# Patient Record
Sex: MALE | Race: OTHER | HISPANIC OR LATINO
[De-identification: names, ages, dates, MRNs, and addresses within clinical notes are randomized per-mention and may not be internally consistent; named-entity substitution may affect disease eponyms.]

---

## 2019-08-24 ENCOUNTER — TRANSCRIPTION ENCOUNTER (OUTPATIENT)
Age: 79
End: 2019-08-24

## 2019-08-24 ENCOUNTER — INPATIENT (INPATIENT)
Facility: HOSPITAL | Age: 79
LOS: 0 days | Discharge: HOME | DRG: 639 | End: 2019-08-24
Attending: INTERNAL MEDICINE | Admitting: INTERNAL MEDICINE
Payer: COMMERCIAL

## 2019-08-24 VITALS
TEMPERATURE: 98 F | WEIGHT: 174.83 LBS | DIASTOLIC BLOOD PRESSURE: 89 MMHG | OXYGEN SATURATION: 97 % | HEART RATE: 67 BPM | RESPIRATION RATE: 15 BRPM | HEIGHT: 70 IN | SYSTOLIC BLOOD PRESSURE: 162 MMHG

## 2019-08-24 VITALS
OXYGEN SATURATION: 99 % | RESPIRATION RATE: 18 BRPM | TEMPERATURE: 98 F | HEART RATE: 61 BPM | WEIGHT: 178.13 LBS | SYSTOLIC BLOOD PRESSURE: 146 MMHG | HEIGHT: 70 IN | DIASTOLIC BLOOD PRESSURE: 86 MMHG

## 2019-08-24 DIAGNOSIS — Z91.89 OTHER SPECIFIED PERSONAL RISK FACTORS, NOT ELSEWHERE CLASSIFIED: ICD-10-CM

## 2019-08-24 DIAGNOSIS — T38.3X1A POISONING BY INSULIN AND ORAL HYPOGLYCEMIC [ANTIDIABETIC] DRUGS, ACCIDENTAL (UNINTENTIONAL), INITIAL ENCOUNTER: ICD-10-CM

## 2019-08-24 DIAGNOSIS — Z29.9 ENCOUNTER FOR PROPHYLACTIC MEASURES, UNSPECIFIED: ICD-10-CM

## 2019-08-24 LAB
ALBUMIN SERPL ELPH-MCNC: 4.4 G/DL — SIGNIFICANT CHANGE UP (ref 3.3–5)
ALP SERPL-CCNC: 56 U/L — SIGNIFICANT CHANGE UP (ref 40–120)
ALT FLD-CCNC: 7 U/L — LOW (ref 10–45)
ANION GAP SERPL CALC-SCNC: 11 MMOL/L — SIGNIFICANT CHANGE UP (ref 5–17)
APTT BLD: 31.7 SEC — SIGNIFICANT CHANGE UP (ref 27.5–36.3)
AST SERPL-CCNC: 21 U/L — SIGNIFICANT CHANGE UP (ref 10–40)
BILIRUB SERPL-MCNC: 0.4 MG/DL — SIGNIFICANT CHANGE UP (ref 0.2–1.2)
BUN SERPL-MCNC: 13 MG/DL — SIGNIFICANT CHANGE UP (ref 7–23)
CALCIUM SERPL-MCNC: 10.2 MG/DL — SIGNIFICANT CHANGE UP (ref 8.4–10.5)
CHLORIDE SERPL-SCNC: 99 MMOL/L — SIGNIFICANT CHANGE UP (ref 96–108)
CO2 SERPL-SCNC: 30 MMOL/L — SIGNIFICANT CHANGE UP (ref 22–31)
CREAT SERPL-MCNC: 0.91 MG/DL — SIGNIFICANT CHANGE UP (ref 0.5–1.3)
ETHANOL SERPL-MCNC: <10 MG/DL — SIGNIFICANT CHANGE UP (ref 0–10)
GLUCOSE SERPL-MCNC: 107 MG/DL — HIGH (ref 70–99)
HCT VFR BLD CALC: 44.4 % — SIGNIFICANT CHANGE UP (ref 39–50)
HGB BLD-MCNC: 14.6 G/DL — SIGNIFICANT CHANGE UP (ref 13–17)
INR BLD: 1.16 — SIGNIFICANT CHANGE UP (ref 0.88–1.16)
MCHC RBC-ENTMCNC: 29.6 PG — SIGNIFICANT CHANGE UP (ref 27–34)
MCHC RBC-ENTMCNC: 32.9 GM/DL — SIGNIFICANT CHANGE UP (ref 32–36)
MCV RBC AUTO: 90.1 FL — SIGNIFICANT CHANGE UP (ref 80–100)
NRBC # BLD: 0 /100 WBCS — SIGNIFICANT CHANGE UP (ref 0–0)
PLATELET # BLD AUTO: 269 K/UL — SIGNIFICANT CHANGE UP (ref 150–400)
POTASSIUM SERPL-MCNC: 4.7 MMOL/L — SIGNIFICANT CHANGE UP (ref 3.5–5.3)
POTASSIUM SERPL-SCNC: 4.7 MMOL/L — SIGNIFICANT CHANGE UP (ref 3.5–5.3)
PROT SERPL-MCNC: 7.5 G/DL — SIGNIFICANT CHANGE UP (ref 6–8.3)
PROTHROM AB SERPL-ACNC: 13.2 SEC — HIGH (ref 10–12.9)
RBC # BLD: 4.93 M/UL — SIGNIFICANT CHANGE UP (ref 4.2–5.8)
RBC # FLD: 12.5 % — SIGNIFICANT CHANGE UP (ref 10.3–14.5)
SODIUM SERPL-SCNC: 140 MMOL/L — SIGNIFICANT CHANGE UP (ref 135–145)
WBC # BLD: 7.61 K/UL — SIGNIFICANT CHANGE UP (ref 3.8–10.5)
WBC # FLD AUTO: 7.61 K/UL — SIGNIFICANT CHANGE UP (ref 3.8–10.5)

## 2019-08-24 PROCEDURE — 70498 CT ANGIOGRAPHY NECK: CPT | Mod: 26

## 2019-08-24 PROCEDURE — 70498 CT ANGIOGRAPHY NECK: CPT

## 2019-08-24 PROCEDURE — 82962 GLUCOSE BLOOD TEST: CPT

## 2019-08-24 PROCEDURE — 70496 CT ANGIOGRAPHY HEAD: CPT | Mod: 26

## 2019-08-24 PROCEDURE — 84484 ASSAY OF TROPONIN QUANT: CPT

## 2019-08-24 PROCEDURE — 99285 EMERGENCY DEPT VISIT HI MDM: CPT

## 2019-08-24 PROCEDURE — 85027 COMPLETE CBC AUTOMATED: CPT

## 2019-08-24 PROCEDURE — 70450 CT HEAD/BRAIN W/O DYE: CPT

## 2019-08-24 PROCEDURE — 99223 1ST HOSP IP/OBS HIGH 75: CPT | Mod: AI

## 2019-08-24 PROCEDURE — 83690 ASSAY OF LIPASE: CPT

## 2019-08-24 PROCEDURE — 70496 CT ANGIOGRAPHY HEAD: CPT

## 2019-08-24 PROCEDURE — 80307 DRUG TEST PRSMV CHEM ANLYZR: CPT

## 2019-08-24 PROCEDURE — 85730 THROMBOPLASTIN TIME PARTIAL: CPT

## 2019-08-24 PROCEDURE — 36415 COLL VENOUS BLD VENIPUNCTURE: CPT

## 2019-08-24 PROCEDURE — 70450 CT HEAD/BRAIN W/O DYE: CPT | Mod: 26,59

## 2019-08-24 PROCEDURE — 80053 COMPREHEN METABOLIC PANEL: CPT

## 2019-08-24 PROCEDURE — 85610 PROTHROMBIN TIME: CPT

## 2019-08-24 RX ORDER — METFORMIN HYDROCHLORIDE 850 MG/1
500 TABLET ORAL
Qty: 30000 | Refills: 0
Start: 2019-08-24 | End: 2019-09-22

## 2019-08-24 RX ORDER — GLYBURIDE-METFORMIN HYDROCHLORIDE 1.25; 25 MG/1; MG/1
1 TABLET ORAL
Qty: 0 | Refills: 0 | DISCHARGE

## 2019-08-24 NOTE — H&P ADULT - NSHPLABSRESULTS_GEN_ALL_CORE
.  LABS:                         14.6   7.61  )-----------( 269      ( 24 Aug 2019 13:14 )             44.4     08-24    140  |  99  |  13  ----------------------------<  107<H>  4.7   |  30  |  0.91    Ca    10.2      24 Aug 2019 13:14    TPro  7.5  /  Alb  4.4  /  TBili  0.4  /  DBili  x   /  AST  21  /  ALT  7<L>  /  AlkPhos  56  08-24    PT/INR - ( 24 Aug 2019 13:14 )   PT: 13.2 sec;   INR: 1.16          PTT - ( 24 Aug 2019 13:14 )  PTT:31.7 sec    CARDIAC MARKERS ( 24 Aug 2019 13:14 )  x     / <0.01 ng/mL / x     / x     / x                RADIOLOGY, EKG & ADDITIONAL TESTS: Reviewed.

## 2019-08-24 NOTE — H&P ADULT - ASSESSMENT
79M with two episodes of sympotmatic hypoglycemia FSG 70 and 60 after waking in AM likely due to metformin-glyburide. No fevers chills, infections, changes to daily routine to suggest sepsis or insulinoma.

## 2019-08-24 NOTE — H&P ADULT - ATTENDING COMMENTS
Patient was seen and examined by me at bedside. I agree with resident's note, subjective, objective physical exam, assessment and plan with following modifications/additions.     Admitted for Iatrogenic hypoglycemia 2/2 Glyburide (POA)    A/P: Medically optimized for discharge. Will D/C Glyburide.   C/w Metformin 500mg bid  Outpatient follow up with PCP

## 2019-08-24 NOTE — DISCHARGE NOTE PROVIDER - PROVIDER TOKENS
FREE:[LAST:[Private],FIRST:[PCP],PHONE:[(   )    -],FAX:[(   )    -],ADDRESS:[See your PCP in one week]]

## 2019-08-24 NOTE — DISCHARGE NOTE NURSING/CASE MANAGEMENT/SOCIAL WORK - NSDCDPATPORTLINK_GEN_ALL_CORE
You can access the [facility] Patient Portal, offered by [facility], by registering with the following website: [email or web address]

## 2019-08-24 NOTE — ED PROVIDER NOTE - CLINICAL SUMMARY MEDICAL DECISION MAKING FREE TEXT BOX
79M pmh COPD, DM, htn, stroke years ago, p/w 2x episodes of confusion and hypoglycemia in past 2 days. First episode yesterday morning, pt woke up and was talking to family about plans to go to work (pt retired 20yr ago), blood sugar checked and noted for be <70, pt had not taken his medication yet that day. Pt is on metformin, no insulin. Today pt had similar. Woke up, approx 9am pt noted to be standing and urinating on carpet, thought was in bathroom. BS noted to ~67, pt given coffee & toast. Pt had not yet taken his metformin today, thus brought to ED. No fever/chills, no n/v, no diarrhea, no abd pain, no cp, no sob. No hx prior episodes. Has hx of LUE weakness s/p stroke, nearly resolved, no sensation changes, exam unchanged. Concern hypoglycemia, medication side effect, consider intox as nurse noted hx of etoh on weekends, less likely stroke given global symptoms and no change from baseline. 79M pmh COPD, DM, htn, stroke years ago, p/w 2x episodes of confusion and hypoglycemia in past 2 days. First episode yesterday morning, pt woke up and was talking to family about plans to go to work (pt retired 20yr ago), blood sugar checked and noted for be <70, pt had not taken his medication yet that day. Pt is on metformin BID, no insulin. Today pt had similar. Woke up, approx 9am pt noted to be standing and urinating on carpet, thought was in bathroom. BS noted to ~67, pt given coffee & toast. Pt had not yet taken his metformin today, but did take it last night. Thus brought to ED. No fever/chills, no n/v, no diarreah, no abd pain, no cp, no sob. No hx prior episodes. Has hx of LUE weakness s/p stroke, nearly resolved, no sensation changes, exam unchanged. Concern hypoglycemia, medication side effect, consider intox as nurse noted hx of etoh on weekends, less likely stroke given global symptoms and no change from baseline. 79M pmh COPD, DM, htn, stroke years ago, p/w 2x episodes of confusion and hypoglycemia in past 2 days. First episode yesterday morning, pt woke up and was talking to family about plans to go to work (pt retired 20yr ago), blood sugar checked and noted for be <70, pt had not taken his medication yet that day. Pt is on metformin BID, no insulin. Today pt had similar. Woke up, approx 9am pt noted to be standing and urinating on carpet, thought was in bathroom. BS noted to ~67, pt given coffee & toast. Pt had not yet taken his metformin today, but did take it last night. Thus brought to ED. No fever/chills, no n/v, no diarreah, no abd pain, no cp, no sob. No hx prior episodes. Has hx of LUE weakness s/p stroke, nearly resolved, no sensation changes, exam unchanged. Concern hypoglycemia, medication side effect, consider intox as nurse noted hx of etoh on weekends, less likely stroke given global symptoms and no change from baseline. CT with acute findings. Given 2x episodes hypoglycemia without sufficiently identifiable cause, will admit med team. 79M pmh COPD, DM, htn, stroke years ago, p/w 2x episodes of confusion and hypoglycemia in past 2 days. First episode yesterday morning, pt woke up and was talking to family about plans to go to work (pt retired 20yr ago), blood sugar checked and noted for be <70, pt had not taken his medication yet that day. Pt is on metformin BID, no insulin. Today pt had similar. Woke up, approx 9am pt noted to be standing and urinating on carpet, thought was in bathroom. BS noted to ~67, pt given coffee & toast. Pt had not yet taken his metformin today, but did take it last night. Thus brought to ED. No fever/chills, no n/v, no diarreah, no abd pain, no cp, no sob. No hx prior episodes. Has hx of LUE weakness s/p stroke, nearly resolved, no sensation changes, exam unchanged. Concern hypoglycemia, medication side effect, consider intox as nurse noted hx of etoh on weekends, less likely stroke given global symptoms and no change from baseline. CT with acute findings. Is on glyburide/metformin combo. Given 2x episodes hypoglycemia without sufficiently identifiable cause, will admit med team.

## 2019-08-24 NOTE — PATIENT PROFILE ADULT - IS THERE A SUSPICION OF ABUSE/NEGLIGENCE?
August 8, 2018     [address]    Patient: Jonny Tavares   YOB: 1974   Date of Visit: 7/10/2018       Dear Dr Reyez Keep:    Thank you for referring Jonny Tavares to me for evaluation  Below are the relevant portions of my H&P  If you have questions, please do not hesitate to call me  I look forward to following Mary Dc along with you  Sincerely,        No name on file          CC: No Recipients no

## 2019-08-24 NOTE — H&P ADULT - HISTORY OF PRESENT ILLNESS
79M PMH CVA with LUE weakness, HTN, DM presents for two episodes of symptomatic hypoglycemia to 70s, 60s with AMS for past two day. No fevers, chills, changes to daily habits outside of episodes. Lives with family.

## 2019-08-24 NOTE — H&P ADULT - NSHPPHYSICALEXAM_GEN_ALL_CORE
.  VITAL SIGNS:  T(C): 36.6 (08-24-19 @ 17:37), Max: 36.8 (08-24-19 @ 12:41)  T(F): 97.9 (08-24-19 @ 17:37), Max: 98.2 (08-24-19 @ 12:41)  HR: 64 (08-24-19 @ 17:37) (61 - 64)  BP: 164/84 (08-24-19 @ 17:37) (146/86 - 164/84)  BP(mean): --  RR: 18 (08-24-19 @ 17:37) (18 - 18)  SpO2: 99% (08-24-19 @ 17:37) (99% - 99%)  Wt(kg): --    PHYSICAL EXAM:    Constitutional: WDWN resting comfortably in bed; NAD  Head: NC/AT  Eyes: PERRL, EOMI, anicteric sclera  ENT: no nasal discharge; uvula midline, no oropharyngeal erythema or exudates; MMM  Neck: supple; no JVD or thyromegaly  Respiratory: CTA B/L; no W/R/R, no retractions  Cardiac: +S1/S2; RRR; no M/R/G; PMI non-displaced  Gastrointestinal: soft, NT/ND; no rebound or guarding; +BSx4  Genitourinary: normal external genitalia  Back: spine midline, no bony tenderness or step-offs; no CVAT B/L  Extremities: WWP, no clubbing or cyanosis; no peripheral edema  Musculoskeletal: NROM x4; no joint swelling, tenderness or erythema  Vascular: 2+ radial, femoral, DP/PT pulses B/L  Dermatologic: skin warm, dry and intact; no rashes, wounds, or scars  Lymphatic: no submandibular or cervical LAD  Neurologic: AAOx3; CNII-XII grossly intact; no focal deficits  Psychiatric: affect and characteristics of appearance, verbalizations, behaviors are appropriate

## 2019-08-24 NOTE — ED ADULT TRIAGE NOTE - CCCP TRG CHIEF CMPLNT
hypoglycemia
FREE:[LAST:[pediatrics clinic],FIRST:[[facility]PHONE:[(   )    -],FAX:[(   )    -]]

## 2019-08-24 NOTE — ED PROVIDER NOTE - OBJECTIVE STATEMENT
79M pmh COPD, DM, htn, stroke years ago, p/w 2x episodes of confusion and hypoglycemia in past 2 days. First episode yesterday morning, pt woke up and was talking to family about plans to go to work (pt retired 20yr ago), blood sugar checked and noted for be <70, pt had not taken his medication yet that day. Pt is on metformin, no insulin. Today pt had similar. Woke up, approx 9am pt noted to be standing and urinating on carpet, thought was in bathroom. BS noted to ~67, pt given coffee & toast. Pt had not yet taken his metformin today, thus brought to ED.   No fever/chills, no n/v, no diarreah, no abd pain, no cp, no sob. No hx prior episodes.  Has hx of LUE weakness s/p stroke, nearly resolved, no sensation changes. 79M pmh COPD, DM, htn, stroke years ago, p/w 2x episodes of confusion and hypoglycemia in past 2 days. First episode yesterday morning, pt woke up and was talking to family about plans to go to work (pt retired 20yr ago), blood sugar checked and noted for be <70, pt had not taken his medication yet that day. Pt is on metformin BID, no insulin. Today pt had similar. Woke up, approx 9am pt noted to be standing and urinating on carpet, thought was in bathroom. BS noted to ~67, pt given coffee & toast. Pt had not yet taken his metformin today, but did take it last night. Thus brought to ED. No fever/chills, no n/v, no diarreah, no abd pain, no cp, no sob. No hx prior episodes.  Has hx of LUE weakness s/p stroke, nearly resolved, no sensation changes.

## 2019-08-24 NOTE — ED PROVIDER NOTE - PHYSICAL EXAMINATION
VITAL SIGNS: I have reviewed nursing notes and confirm.  CONSTITUTIONAL: Well-developed; well-nourished; in no acute distress.  SKIN: Skin is warm and dry, no acute rash.  HEAD: Normocephalic; atraumatic.  EYES:  EOM intact; conjunctiva and sclera clear.  ENT: No nasal discharge; airway clear.  NECK: Supple; Voluntary FROM  CARD: No rubs appreciated, Regular rate and rhythm.  RESP: No wheezes, no rales. No respiratory distress  ABD: Soft; non-distended; non-tender; no rebound or guarding  EXT: Normal ROM. No cyanosis or edema.  NEURO: Patient is alert, oriented x person, place and time.  Cranial nerves 2-12 are intact.  Normal gait and speech.  Cerebellar testing normal:  negative pronator drift, normal coordination and normal finger to nose on R w L side mild difficulty (baseline), LUE 4/5 strength, 5/5 BUE/BLE;  Normal proprioception and sensory exam  PSYCH: Cooperative, appropriate.

## 2019-08-24 NOTE — DISCHARGE NOTE PROVIDER - NSDCCPCAREPLAN_GEN_ALL_CORE_FT
PRINCIPAL DISCHARGE DIAGNOSIS  Diagnosis: Hypoglycemia secondary to sulfonylurea, accidental or unintentional, initial encounter  Assessment and Plan of Treatment: You came in because you were having episodes of confusion in the morning while your fingerstick glu was low to the 60s. This is likely due to your use of the combined Metformin-Glyburide pill. Glyburide causes low sugars in elderly people. You will stop taking this pill and take Metformin 500mg twice a day. You should start taking Metformin on 8/28/2019. Take a snack before you go to bed for the next week. Take your blood sugar anytime you have these symptoms again. Follow up with your PCP in one week.

## 2019-08-24 NOTE — ED ADULT NURSE NOTE - OBJECTIVE STATEMENT
79 year old male PHX Parkinson's, HTN, DM, CVA (with left sided residual weakness) c/o disorientation x 2 episodes, yesterday at 06:00 and today at 09:00. As per pt's daughter, pt thought that he was in the bathroom yesterday and urinated on the floor. Daughter quotes "we called the EMS and they checked his sugar and it was 65, they gave him a sugar gel and he got better." Also reported today, "he thought he was going to school, like he was lost." Pt does not recall events. Denies any chest pain, sob, dizziness, weakness, f/v, abdominal pain, urinary symptoms. Denies changes in medication. 79 year old male PHX Parkinson's, HTN, DM, CVA (with left sided residual weakness) c/o disorientation x 2 episodes, yesterday at 06:00 and today at 09:00. As per pt's daughter, pt thought that he was in the bathroom yesterday and urinated on the floor. Daughter quotes "we called the EMS and they checked his sugar and it was 65, they gave him a sugar gel and he got better." Also reported today, "he thought he was going to school, like he was lost." Pt does not recall events. Denies any chest pain, sob, dizziness, weakness, f/c, abdominal pain, urinary symptoms. Denies changes in medication.

## 2019-08-29 DIAGNOSIS — Z86.73 PERSONAL HISTORY OF TRANSIENT ISCHEMIC ATTACK (TIA), AND CEREBRAL INFARCTION WITHOUT RESIDUAL DEFICITS: ICD-10-CM

## 2019-08-29 DIAGNOSIS — I10 ESSENTIAL (PRIMARY) HYPERTENSION: ICD-10-CM

## 2019-08-29 DIAGNOSIS — Z79.84 LONG TERM (CURRENT) USE OF ORAL HYPOGLYCEMIC DRUGS: ICD-10-CM
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
## 2019-08-29 DIAGNOSIS — J44.9 CHRONIC OBSTRUCTIVE PULMONARY DISEASE, UNSPECIFIED: ICD-10-CM

## 2019-08-29 DIAGNOSIS — E16.2 HYPOGLYCEMIA, UNSPECIFIED: ICD-10-CM

## 2019-08-29 DIAGNOSIS — T38.3X5A ADVERSE EFFECT OF INSULIN AND ORAL HYPOGLYCEMIC [ANTIDIABETIC] DRUGS, INITIAL ENCOUNTER: ICD-10-CM

## 2019-08-29 DIAGNOSIS — Z79.82 LONG TERM (CURRENT) USE OF ASPIRIN: ICD-10-CM

## 2019-08-29 DIAGNOSIS — G20 PARKINSON'S DISEASE: ICD-10-CM

## 2019-08-29 DIAGNOSIS — E11.649 TYPE 2 DIABETES MELLITUS WITH HYPOGLYCEMIA WITHOUT COMA: ICD-10-CM

## 2020-01-06 ENCOUNTER — EMERGENCY (EMERGENCY)
Facility: HOSPITAL | Age: 80
LOS: 1 days | Discharge: HOME | End: 2020-01-06
Attending: EMERGENCY MEDICINE | Admitting: EMERGENCY MEDICINE
Payer: MEDICARE

## 2020-01-06 VITALS
RESPIRATION RATE: 18 BRPM | SYSTOLIC BLOOD PRESSURE: 167 MMHG | HEIGHT: 71 IN | OXYGEN SATURATION: 96 % | HEART RATE: 67 BPM | DIASTOLIC BLOOD PRESSURE: 85 MMHG | TEMPERATURE: 98 F | WEIGHT: 173.06 LBS

## 2020-01-06 DIAGNOSIS — M54.41 LUMBAGO WITH SCIATICA, RIGHT SIDE: ICD-10-CM

## 2020-01-06 DIAGNOSIS — Z79.899 OTHER LONG TERM (CURRENT) DRUG THERAPY: ICD-10-CM

## 2020-01-06 DIAGNOSIS — I10 ESSENTIAL (PRIMARY) HYPERTENSION: ICD-10-CM

## 2020-01-06 DIAGNOSIS — Z79.4 LONG TERM (CURRENT) USE OF INSULIN: ICD-10-CM

## 2020-01-06 DIAGNOSIS — M54.5 LOW BACK PAIN: ICD-10-CM

## 2020-01-06 DIAGNOSIS — E11.9 TYPE 2 DIABETES MELLITUS WITHOUT COMPLICATIONS: ICD-10-CM

## 2020-01-06 PROCEDURE — 99283 EMERGENCY DEPT VISIT LOW MDM: CPT | Mod: 25

## 2020-01-06 PROCEDURE — 99284 EMERGENCY DEPT VISIT MOD MDM: CPT

## 2020-01-06 PROCEDURE — 96372 THER/PROPH/DIAG INJ SC/IM: CPT

## 2020-01-06 RX ORDER — KETOROLAC TROMETHAMINE 30 MG/ML
30 SYRINGE (ML) INJECTION ONCE
Refills: 0 | Status: DISCONTINUED | OUTPATIENT
Start: 2020-01-06 | End: 2020-01-06

## 2020-01-06 RX ORDER — METHOCARBAMOL 500 MG/1
1 TABLET, FILM COATED ORAL
Qty: 20 | Refills: 0
Start: 2020-01-06 | End: 2020-01-10

## 2020-01-06 RX ADMIN — Medication 30 MILLIGRAM(S): at 22:25

## 2020-01-06 RX ADMIN — Medication 30 MILLIGRAM(S): at 22:30

## 2020-01-06 NOTE — ED PROVIDER NOTE - PHYSICAL EXAMINATION
CONSTITUTIONAL: Well-appearing; well-nourished; in no apparent distress.   HEAD: Normocephalic; atraumatic.   EYES:  conjunctiva and sclera clear  ENT: normal nose; no rhinorrhea;  NECK: Supple; full ROM  RESPIRATORY: Breathing easily; no resp difficulty  GI: soft non tender, no bladder distension  BACK: no cervical/thoracic/lumbar tenderness to palpation.   EXT: No cyanosis or edema;   SKIN: Normal for age and race; warm; dry; good turgor; no apparent lesions or rash.   NEURO: A & O x 3; face symmetric; grossly unremarkable. sensation intact in all extremities to light touch. motor intact 5/5 symmetric with doriflexion/plantar flexion, knee extension/flexion, and hip flexion. Ambulatory with steady gait. no foot drop.   PSYCHOLOGICAL: The patient’s mood and manner are appropriate.

## 2020-01-06 NOTE — ED PROVIDER NOTE - PATIENT PORTAL LINK FT
You can access the FollowMyHealth Patient Portal offered by [facility] by registering at the following website: [email or web address]. By joining [employer]’s FollowMyHealth portal, you will also be able to view your health information using other applications (apps) compatible with our system.

## 2020-01-06 NOTE — ED PROVIDER NOTE - CLINICAL SUMMARY MEDICAL DECISION MAKING FREE TEXT BOX
here w/ likely sciatica. plan for supportive care. no red flag symptoms. DC home in NAD with strict return precautions given.

## 2020-01-06 NOTE — ED ADULT NURSE NOTE - CHPI ED NUR SYMPTOMS NEG
no bowel dysfunction/no motor function loss/no numbness/no fatigue/no bladder dysfunction/no difficulty bearing weight/no anorexia/no constipation/no tingling/no neck tenderness

## 2020-01-06 NOTE — ED PROVIDER NOTE - NSFOLLOWUPINSTRUCTIONS_ED_ALL_ED_FT
Sciatica     Sciatica is pain, numbness, weakness, or tingling along the path of the sciatic nerve. The sciatic nerve starts in the lower back and runs down the back of each leg. The nerve controls the muscles in the lower leg and in the back of the knee. It also provides feeling (sensation) to the back of the thigh, the lower leg, and the sole of the foot. Sciatica is a symptom of another medical condition that pinches or puts pressure on the sciatic nerve.    Generally, sciatica only affects one side of the body. Sciatica usually goes away on its own or with treatment. In some cases, sciatica may keep coming back (recur).    What are the causes?  This condition is caused by pressure on the sciatic nerve, or pinching of the sciatic nerve. This may be the result of:  A disk in between the bones of the spine (vertebrae) bulging out too far (herniated disk).Age-related changes in the spinal disks (degenerative disk disease).A pain disorder that affects a muscle in the buttock (piriformis syndrome).Extra bone growth (bone spur) near the sciatic nerve.An injury or break (fracture) of the pelvis.Pregnancy.Tumor (rare).What increases the risk?  The following factors may make you more likely to develop this condition:  Playing sports that place pressure or stress on the spine, such as football or weight lifting.Having poor strength and flexibility.A history of back injury.A history of back surgery.Sitting for long periods of time.Doing activities that involve repetitive bending or lifting.Obesity.What are the signs or symptoms?  Symptoms can vary from mild to very severe, and they may include:  Any of these problems in the lower back, leg, hip, or buttock:  Mild tingling or dull aches.Burning sensations.Sharp pains.Numbness in the back of the calf or the sole of the foot.Leg weakness.Severe back pain that makes movement difficult.These symptoms may get worse when you cough, sneeze, or laugh, or when you sit or stand for long periods of time. Being overweight may also make symptoms worse. In some cases, symptoms may recur over time.  How is this diagnosed?  This condition may be diagnosed based on:  Your symptoms.A physical exam. Your health care provider may ask you to do certain movements to check whether those movements trigger your symptoms.You may have tests, including:  Blood tests.X-rays.MRI.CT scan.How is this treated?  In many cases, this condition improves on its own, without any treatment. However, treatment may include:  Reducing or modifying physical activity during periods of pain.Exercising and stretching to strengthen your abdomen and improve the flexibility of your spine.Icing and applying heat to the affected area.Medicines that help:  To relieve pain and swelling.To relax your muscles.Injections of medicines that help to relieve pain, irritation, and inflammation around the sciatic nerve (steroids).Surgery.Follow these instructions at home:  Medicines     Take over-the-counter and prescription medicines only as told by your health care provider.Do not drive or operate heavy machinery while taking prescription pain medicine.Managing pain     If directed, apply ice to the affected area.  Put ice in a plastic bag.Place a towel between your skin and the bag.Leave the ice on for 20 minutes, 2–3 times a day.After icing, apply heat to the affected area before you exercise or as often as told by your health care provider. Use the heat source that your health care provider recommends, such as a moist heat pack or a heating pad.  Place a towel between your skin and the heat source.Leave the heat on for 20–30 minutes.Remove the heat if your skin turns bright red. This is especially important if you are unable to feel pain, heat, or cold. You may have a greater risk of getting burned.Activity     Return to your normal activities as told by your health care provider. Ask your health care provider what activities are safe for you.  Avoid activities that make your symptoms worse.Take brief periods of rest throughout the day. Resting in a lying or standing position is usually better than sitting to rest.  When you rest for longer periods, mix in some mild activity or stretching between periods of rest. This will help to prevent stiffness and pain.Avoid sitting for long periods of time without moving. Get up and move around at least one time each hour.Exercise and stretch regularly, as told by your health care provider.Do not lift anything that is heavier than 10 lb (4.5 kg) while you have symptoms of sciatica. When you do not have symptoms, you should still avoid heavy lifting, especially repetitive heavy lifting.When you lift objects, always use proper lifting technique, which includes:  Bending your knees.Keeping the load close to your body.Avoiding twisting.General instructions     Use good posture.  Avoid leaning forward while sitting.Avoid hunching over while standing.Maintain a healthy weight. Excess weight puts extra stress on your back and makes it difficult to maintain good posture.Wear supportive, comfortable shoes. Avoid wearing high heels.Avoid sleeping on a mattress that is too soft or too hard. A mattress that is firm enough to support your back when you sleep may help to reduce your pain.Keep all follow-up visits as told by your health care provider. This is important.Contact a health care provider if:  You have pain that wakes you up when you are sleeping.You have pain that gets worse when you lie down.Your pain is worse than you have experienced in the past.Your pain lasts longer than 4 weeks.You experience unexplained weight loss.Get help right away if:  You lose control of your bowel or bladder (incontinence).You have:  Weakness in your lower back, pelvis, buttocks, or legs that gets worse.Redness or swelling of your back.A burning sensation when you urinate

## 2020-01-06 NOTE — ED PROVIDER NOTE - OBJECTIVE STATEMENT
78yo PMH of parkinson's, DM type II, HLD, HTN here w/ moderate to severe R sided lower back pain. denies recent falls. no heavy lifting. no CVA tenderness. reports frequency with urination today. took Motrin this am without relief. no bladder/ bowel incontinence or retention. no numbness ,tingling, weakness. denies perineal anesthesia. has had this before and feels similar to prior episodes of sciatica.

## 2020-01-06 NOTE — ED ADULT NURSE NOTE - OBJECTIVE STATEMENT
79 y.o Ma &ox4 walked in from front triage w/ steady gait c.o lower back pain. Pt reports 7/10 R sided lower back pain. denies recent falls. no heavy lifting. no CVA tenderness. reports frequency with urination today. took Motrin this am without relief. no bladder/ bowel incontinence. no numbness ,tingling, weakness. speaking in clear appropriate sentences, airway patent. PMH of parkinson's, DM type II, HLD, HTN.

## 2020-01-06 NOTE — ED ADULT NURSE NOTE - GASTROINTESTINAL WDL
Normal vision: sees adequately in most situations; can see medication labels, newsprint Abdomen soft, nontender, nondistended, bowel sounds present in all 4 quadrants.

## 2020-01-07 PROBLEM — I10 ESSENTIAL (PRIMARY) HYPERTENSION: Chronic | Status: ACTIVE | Noted: 2019-08-24

## 2020-01-07 PROBLEM — G20 PARKINSON'S DISEASE: Chronic | Status: ACTIVE | Noted: 2019-08-24

## 2020-01-07 PROBLEM — E11.9 TYPE 2 DIABETES MELLITUS WITHOUT COMPLICATIONS: Chronic | Status: ACTIVE | Noted: 2019-08-24

## 2020-01-07 PROBLEM — I63.9 CEREBRAL INFARCTION, UNSPECIFIED: Chronic | Status: ACTIVE | Noted: 2019-08-24

## 2021-04-06 ENCOUNTER — EMERGENCY (EMERGENCY)
Facility: HOSPITAL | Age: 81
LOS: 1 days | Discharge: HOME | End: 2021-04-06
Attending: EMERGENCY MEDICINE | Admitting: EMERGENCY MEDICINE
Payer: MEDICARE

## 2021-04-06 VITALS
OXYGEN SATURATION: 98 % | WEIGHT: 162.92 LBS | TEMPERATURE: 98 F | DIASTOLIC BLOOD PRESSURE: 81 MMHG | SYSTOLIC BLOOD PRESSURE: 144 MMHG | RESPIRATION RATE: 18 BRPM | HEIGHT: 71 IN | HEART RATE: 73 BPM

## 2021-04-06 LAB
ALBUMIN SERPL ELPH-MCNC: 4.1 G/DL — SIGNIFICANT CHANGE UP (ref 3.3–5)
ALP SERPL-CCNC: 63 U/L — SIGNIFICANT CHANGE UP (ref 40–120)
ALT FLD-CCNC: 9 U/L — LOW (ref 10–45)
ANION GAP SERPL CALC-SCNC: 11 MMOL/L — SIGNIFICANT CHANGE UP (ref 5–17)
AST SERPL-CCNC: 17 U/L — SIGNIFICANT CHANGE UP (ref 10–40)
BILIRUB SERPL-MCNC: 0.4 MG/DL — SIGNIFICANT CHANGE UP (ref 0.2–1.2)
BUN SERPL-MCNC: 14 MG/DL — SIGNIFICANT CHANGE UP (ref 7–23)
CALCIUM SERPL-MCNC: 10.3 MG/DL — SIGNIFICANT CHANGE UP (ref 8.4–10.5)
CHLORIDE SERPL-SCNC: 96 MMOL/L — SIGNIFICANT CHANGE UP (ref 96–108)
CO2 SERPL-SCNC: 28 MMOL/L — SIGNIFICANT CHANGE UP (ref 22–31)
CREAT SERPL-MCNC: 0.93 MG/DL — SIGNIFICANT CHANGE UP (ref 0.5–1.3)
GLUCOSE SERPL-MCNC: 121 MG/DL — HIGH (ref 70–99)
HCT VFR BLD CALC: 41.7 % — SIGNIFICANT CHANGE UP (ref 39–50)
HGB BLD-MCNC: 13.6 G/DL — SIGNIFICANT CHANGE UP (ref 13–17)
MCHC RBC-ENTMCNC: 29.1 PG — SIGNIFICANT CHANGE UP (ref 27–34)
MCHC RBC-ENTMCNC: 32.6 GM/DL — SIGNIFICANT CHANGE UP (ref 32–36)
MCV RBC AUTO: 89.1 FL — SIGNIFICANT CHANGE UP (ref 80–100)
NRBC # BLD: 0 /100 WBCS — SIGNIFICANT CHANGE UP (ref 0–0)
PLATELET # BLD AUTO: 281 K/UL — SIGNIFICANT CHANGE UP (ref 150–400)
POTASSIUM SERPL-MCNC: 4.5 MMOL/L — SIGNIFICANT CHANGE UP (ref 3.5–5.3)
POTASSIUM SERPL-SCNC: 4.5 MMOL/L — SIGNIFICANT CHANGE UP (ref 3.5–5.3)
PROT SERPL-MCNC: 7.4 G/DL — SIGNIFICANT CHANGE UP (ref 6–8.3)
RBC # BLD: 4.68 M/UL — SIGNIFICANT CHANGE UP (ref 4.2–5.8)
RBC # FLD: 12 % — SIGNIFICANT CHANGE UP (ref 10.3–14.5)
SODIUM SERPL-SCNC: 135 MMOL/L — SIGNIFICANT CHANGE UP (ref 135–145)
WBC # BLD: 6.82 K/UL — SIGNIFICANT CHANGE UP (ref 3.8–10.5)
WBC # FLD AUTO: 6.82 K/UL — SIGNIFICANT CHANGE UP (ref 3.8–10.5)

## 2021-04-06 PROCEDURE — G1004: CPT

## 2021-04-06 PROCEDURE — 99284 EMERGENCY DEPT VISIT MOD MDM: CPT | Mod: 25

## 2021-04-06 PROCEDURE — 99284 EMERGENCY DEPT VISIT MOD MDM: CPT

## 2021-04-06 PROCEDURE — 36415 COLL VENOUS BLD VENIPUNCTURE: CPT

## 2021-04-06 PROCEDURE — 70491 CT SOFT TISSUE NECK W/DYE: CPT | Mod: 26,MG

## 2021-04-06 PROCEDURE — 70491 CT SOFT TISSUE NECK W/DYE: CPT

## 2021-04-06 PROCEDURE — 85027 COMPLETE CBC AUTOMATED: CPT

## 2021-04-06 PROCEDURE — 80053 COMPREHEN METABOLIC PANEL: CPT

## 2021-04-06 RX ORDER — LIDOCAINE 4 G/100G
5 CREAM TOPICAL ONCE
Refills: 0 | Status: COMPLETED | OUTPATIENT
Start: 2021-04-06 | End: 2021-04-06

## 2021-04-06 RX ADMIN — Medication 30 MILLILITER(S): at 23:45

## 2021-04-06 RX ADMIN — LIDOCAINE 5 MILLILITER(S): 4 CREAM TOPICAL at 23:43

## 2021-04-06 NOTE — ED PROVIDER NOTE - OBJECTIVE STATEMENT
80 y/o fully vaccinated for COVID (more than 2 W ago) M PMHx asthma, DM, HTN, parkinsons, and arthritis presents to the ED w/ multiple complaints. Pt p/w throbbing throat swelling/ pain, bruising on torso and face, and decreased PO intake w/ 12lb loss in 1 W. On 3/24/21 he underwent dental surgery where he was given Midazolam for anesthesia. His o2sat then dropped to 79 and he went to the ED. At the hospital, pt does not remember the surgery or the ambulance ride, but remembers only waking up in the ED asking for his daughter. He does not remember how he got bruises on his torso and face. Pt reports that when he eats liquid foods, he is only able to take 2-3 bites of food before he stops eating which resulted in his 12lb weight loss. He also reports that food gets stuck in his throat more often, needing him to drink water to wash it down. He was at [facility] on 3/24.

## 2021-04-06 NOTE — ED PROVIDER NOTE - PATIENT PORTAL LINK FT
You can access the FollowMyHealth Patient Portal offered by [facility] by registering at the following website: [email or web address]. By joining [employer]’s FollowMyHealth portal, you will also be able to view your health information using other applications (apps) compatible with our system.

## 2021-04-06 NOTE — ED ADULT NURSE NOTE - OBJECTIVE STATEMENT
81y M, hx of parkinson's, brought in by daughter for dysphagia and weight loss. Reports recent tooth surgery at dentist on 3/24, under general anesthesia, was sent to [facility] due to hypoxia. Pt had xray and blood work done and dc. Per daughter, pt also had "bruising" when he left hospital to upper shoulders. PT continues to report decreased po intake with loss of 13lbs. Pt able to hold secretions. no cp, no sob, no fevers nor chills.

## 2021-04-06 NOTE — ED ADULT TRIAGE NOTE - ARRIVAL INFO ADDITIONAL COMMENTS
[facility] sent to the ED from [facility]. c/o painful swallowing s/p dental procedure last tuesday. daughter reports weight loss over the past week due to painful swallowing

## 2021-04-06 NOTE — ED PROVIDER NOTE - CLINICAL SUMMARY MEDICAL DECISION MAKING FREE TEXT BOX
diff swallowing after dental repair, will obtain labs, CT r/o abscess/infection, s/o to Dr. Ponce and ISAMAR Varela. no other acute trauma.

## 2021-04-06 NOTE — ED PROVIDER NOTE - NSFOLLOWUPINSTRUCTIONS_ED_ALL_ED_FT
Make sure food is well chewed.  Stay well hydrated.  Return for fevers, persistent vomit, uncontrolled pain, worsening breathing, worsening lightheaded, unable to swallow saliva.  Follow up with primary doctor within 1-2 days.   Follow up with gastroenterologist. Can call [phone] to schedule appointment.     Dysphagia    Dysphagia is trouble swallowing. This condition occurs when solids and liquids stick in a person's throat on the way down to the stomach, or when food takes longer to get to the stomach. You may have problems swallowing food, liquids, or both. You may also have pain while trying to swallow. It may take you more time and effort to swallow something.    What are the causes?  This condition is caused by:  Problems with the muscles. They may make it difficult for you to move food and liquids through the tube that connects your mouth to your stomach (esophagus). You may have ulcers, scar tissue, or inflammation that blocks the normal passage of food and liquids. Causes of these problems include:  Acid reflux from your stomach into your esophagus (gastroesophageal reflux). Infections. Radiation treatment for cancer. Medicines taken without enough fluids to wash them down into your stomach. Nerve problems. These prevent signals from being sent to the muscles of your esophagus to squeeze (contract) and move what you swallow down to your stomach. Globus pharyngeus. This is a common problem that involves feeling like something is stuck in the throat or a sense of trouble with swallowing even though nothing is wrong with the swallowing passages. Stroke. This can affect the nerves and make it difficult to swallow. Certain conditions, such as cerebral palsy or Parkinson disease.    What are the signs or symptoms?  Common symptoms of this condition include:  A feeling that solids or liquids are stuck in your throat on the way down to the stomach .Food taking too long to get to the stomach.  Other symptoms include:  Food moving back from your stomach to your mouth (regurgitation). Noises coming from your throat. Chest discomfort with swallowing. A feeling of fullness when swallowing .Drooling, especially when the throat is blocked. Pain while swallowing. Heartburn. Coughing or gagging while trying to swallow.    How is this diagnosed?  This condition is diagnosed by:  Barium X-ray. In this test, you swallow a white substance (contrast medium)that sticks to the inside of your esophagus. X-ray images are then taken. Endoscopy. In this test, a flexible telescope is inserted down your throat to look at your esophagus and your stomach. CT scans and MRI.    How is this treated?  Treatment for dysphagia depends on the cause of the condition:  If the dysphagia is caused by acid reflux or infection, medicines may be used. They may include antibiotics and heartburn medicines. If the dysphagia is caused by problems with your muscles, swallowing therapy may be used to help you strengthen your swallowing muscles. You may have to do specific exercises to strengthen the muscles or stretch them. If the dysphagia is caused by a blockage or mass, procedures to remove the blockage may be done. You may need surgery and a feeding tube. You may need to make diet changes. Ask your health care provider for specific instructions.    Follow these instructions at home:    Eating and drinking   Try to eat soft food that is easier to swallow. Follow any diet changes as told by your health care provider. Cut your food into small pieces and eat slowly. Eat and drink only when you are sitting upright. Do not drink alcohol or caffeine. If you need help quitting, ask your health care provider. General instructions     Check your weight every day to make sure you are not losing weight. Take over-the-counter and prescription medicines only as told by your health care provider. If you were prescribed an antibiotic medicine, take it as told by your health care provider. Do not stop taking the antibiotic even if you start to feel better. Do not use any products that contain nicotine or tobacco, such as cigarettes and e-cigarettes. If you need help quitting, ask your health care provider.    Keep all follow-up visits as told by your health care provider. This is important. Contact a health care provider if:  You lose weight because you cannot swallow. You cough when you drink liquids (aspiration).You cough up partially digested food. Get help right away if:  You cannot swallow your saliva. You have shortness of breath or a fever, or both. You have a hoarse voice and also have trouble swallowing.

## 2021-04-06 NOTE — ED ADULT NURSE NOTE - DRUG PRE-SCREENING (DAST -1)
I called to Home health on 10/25 and left message that we never received results.  After 15 min received fax with results and gave to LOUISA Myers.  Statement Selected

## 2021-04-06 NOTE — ED ADULT NURSE NOTE - NSIMPLEMENTINTERV_GEN_ALL_ED
Implemented All Fall with Harm Risk Interventions:  [place] to call system. Call bell, personal items and telephone within reach. Instruct patient to call for assistance. Room bathroom lighting operational. Non-slip footwear when patient is off stretcher. Physically safe environment: no spills, clutter or unnecessary equipment. Stretcher in lowest position, wheels locked, appropriate side rails in place. Provide visual cue, wrist band, yellow gown, etc. Monitor gait and stability. Monitor for mental status changes and reorient to person, place, and time. Review medications for side effects contributing to fall risk. Reinforce activity limits and safety measures with patient and family. Provide visual clues: red socks.

## 2021-04-06 NOTE — ED ADULT NURSE NOTE - DISCHARGE DATE/TIME
"Chief complaint:   Chief Complaint   Patient presents with   â¢ Sinus Problem       Vitals:  Visit Vitals  /70 ([place] [facility] [place]itting, Cuff Size: Regular)   Pulse 78   Temp 98.3 Â°F (36.8 Â°C) (Oral)   Ht 5' 6"" (1.676 m)   Wt 76.2 kg   BMI 27.12 kg/mÂ²       HISTORY OF PRESENT ILLNESS     She has had symptoms for a couple weeks. She has been using the neti pot and using decongestants. She had a low grade fever. She is exposed to flu at work. Her face, teeth and ears hurt. She is having some colored nasal discharge. She does typically respond to antibiotics. She works at maintaining good sinus hygiene with use of the neti pot. She has a known deviated septum and probably a tendency toward Sjogren's disease, although it's never been diagnosed. There is no previous history of CT of the sinuses. She continues to work at [employer].         Other significant problems:  Patient Active Problem List    Diagnosis Date Noted   â¢ Controlled diabetes mellitus type II without complication (CMS/[facility]) 80/19/4899     Priority: Medium   â¢ Family history of colon cancer 11/13/2014     Priority: Medium     Colonoscopy 2014, repeat 2019     â¢ GERD (gastroesophageal reflux disease)      Priority: Medium   â¢ Tobacco use disorder      Priority: Medium   â¢ Unspecified vitamin D deficiency      Priority: Medium   â¢ Family history of melanoma 09/04/2015     Priority: Low     3 siblings     â¢ Dry eyes 02/23/2015     Priority: Low   â¢ HLD (hyperlipidemia)      Priority: Low   â¢ Anxiety      Priority: Low   â¢ MVP (mitral valve prolapse)      Priority: Low   â¢ Obsessive-compulsive disorders      Priority: Low   â¢ Hypertonicity of bladder      Priority: Low   â¢ Osteopenia 10/26/2010     Priority: Low       PAST MEDICAL, FAMILY AND SOCIAL HISTORY     Medications:  Current Outpatient Prescriptions   Medication   â¢ metFORMIN (GLUCOPHAGE) 500 MG tablet   â¢ pantoprazole (PROTONIX) 40 MG tablet   â¢ FLUoxetine " (PROZAC) 20 MG capsule   â¢ cetirizine (ZYRTEC ALLERGY) 10 MG tablet   â¢ levofloxacin (LEVAQUIN) 500 MG tablet     No current facility-administered medications for this visit. Allergies:  ALLERGIES:   Allergen Reactions   â¢ Penicillins      anaphylaxis   â¢ Enablex      Sore tongue   â¢ Simvastatin MYALGIA       Past Medical  History/Surgeries:  Past Medical History:   Diagnosis Date   â¢ Anxiety    â¢ Depression    â¢ Diabetes mellitus (CMS/HCC)    â¢ GERD (gastroesophageal reflux disease)    â¢ HLD (hyperlipidemia)    â¢ Hypertonicity of bladder    â¢ MVP (mitral valve prolapse)    â¢ Myalgia due to Simvastatin 09/03/2014    See Allergy List   â¢ Obsessive-compulsive disorders    â¢ Osteopenia 10/26/2010   â¢ Pure hyperglyceridemia    â¢ Tobacco use disorder    â¢ Unspecified vitamin D deficiency        Past Surgical History:   Procedure Laterality Date   â¢ CARPAL TUNNEL RELEASE  11/10/2008    Carpal Tunnel   â¢ COLONOSCOPY  11-   â¢ DEXA BONE DENSITY AXIAL SKELETON  11/17/2010   â¢ HYSTERECTOMY  01/01/2003   â¢ TONSILLECTOMY AND ADENOIDECTOMY         Family History:  Family History   Problem Relation Age of Onset   â¢ Substance abuse Father      etoh   â¢ Other Father      pancreatitis   â¢ Thyroid Daughter    â¢ Migraine Daughter    â¢ Migraine Son    â¢ Cancer Sister      colon   â¢ Cancer Maternal Aunt      breast   â¢ Cancer Maternal Grandmother      breast   â¢ Cancer Sister      melanoma   â¢ Cancer Brother      melanoma       Social History:  Social History   Substance Use Topics   â¢ Smoking status: Current Every Day Smoker     Packs/day: 0.50     Years: 30.00     Types: Cigarettes   â¢ Smokeless tobacco: Never Used   â¢ Alcohol use No       REVIEW OF SYSTEMS     Review of Systems   Constitutional: Positive for fatigue and fever. HENT: Positive for congestion, postnasal drip, sinus pain and sinus pressure. Negative for sore throat. Neurological: Positive for headaches.  Negative for dizziness, facial asymmetry and light-headedness. PHYSICAL EXAM     Physical Exam   Constitutional: She is cooperative. No distress. HENT:   Head: Normocephalic and atraumatic. Right Ear: Hearing, tympanic membrane and ear canal normal. Tympanic membrane is not erythematous and not bulging. Left Ear: Hearing, tympanic membrane and ear canal normal. Tympanic membrane is not erythematous and not bulging. Nose: Sinus tenderness present. No rhinorrhea. Right sinus exhibits maxillary sinus tenderness and frontal sinus tenderness. Left sinus exhibits frontal sinus tenderness. Left sinus exhibits no maxillary sinus tenderness. Mouth/Throat: Mucous membranes are normal. No oral lesions. No oropharyngeal exudate. Eyes: Conjunctivae are normal.   Cardiovascular: Normal rate and regular rhythm. No murmur heard. Pulmonary/Chest: Effort normal. She has no wheezes. She has no rhonchi. She has no rales. Lymphadenopathy:        Head (right side): No submental, no submandibular, no preauricular and no posterior auricular adenopathy present. Head (left side): No submental, no submandibular, no preauricular and no posterior auricular adenopathy present. She has no cervical adenopathy. Neurological: She is alert. Skin: Skin is warm and dry. No rash noted. Psychiatric: She has a normal mood and affect. Her behavior is normal.   Vitals reviewed. ASSESSMENT/PLAN     ASSESSMENT/PLAN:  1. Sinusitis chronic, frontal    2. Tobacco use disorder    3. Encounter to obtain excuse from work      Orders Placed This Encounter   â¢ levofloxacin (LEVAQUIN) 500 MG tablet     Levaquin 500 mg 1 tablet daily. Discussion of potential side effects and complications. She is strongly encouraged to quit smoking. 07-Apr-2021 01:06

## 2021-04-06 NOTE — ED ADULT NURSE NOTE - CHPI ED NUR SYMPTOMS NEG
no bleeding gums/no chills/no fever/no loss of consciousness/no nausea/no numbness/no syncope/no vomiting

## 2021-04-07 VITALS
TEMPERATURE: 98 F | OXYGEN SATURATION: 98 % | DIASTOLIC BLOOD PRESSURE: 86 MMHG | HEART RATE: 75 BPM | RESPIRATION RATE: 16 BRPM | SYSTOLIC BLOOD PRESSURE: 150 MMHG

## 2021-04-10 DIAGNOSIS — R13.10 DYSPHAGIA, UNSPECIFIED: ICD-10-CM

## 2021-04-10 DIAGNOSIS — Z86.73 PERSONAL HISTORY OF TRANSIENT ISCHEMIC ATTACK (TIA), AND CEREBRAL INFARCTION WITHOUT RESIDUAL DEFICITS: ICD-10-CM

## 2021-04-10 DIAGNOSIS — G20 PARKINSON'S DISEASE: ICD-10-CM

## 2021-04-10 DIAGNOSIS — J45.909 UNSPECIFIED ASTHMA, UNCOMPLICATED: ICD-10-CM

## 2021-04-10 DIAGNOSIS — Z79.899 OTHER LONG TERM (CURRENT) DRUG THERAPY: ICD-10-CM

## 2021-04-10 DIAGNOSIS — I10 ESSENTIAL (PRIMARY) HYPERTENSION: ICD-10-CM

## 2021-04-10 DIAGNOSIS — Z79.82 LONG TERM (CURRENT) USE OF ASPIRIN: ICD-10-CM

## 2021-04-10 DIAGNOSIS — R07.0 PAIN IN THROAT: ICD-10-CM

## 2021-04-10 DIAGNOSIS — E11.9 TYPE 2 DIABETES MELLITUS WITHOUT COMPLICATIONS: ICD-10-CM

## 2021-04-10 DIAGNOSIS — Z79.84 LONG TERM (CURRENT) USE OF ORAL HYPOGLYCEMIC DRUGS: ICD-10-CM

## 2021-04-27 ENCOUNTER — NON-APPOINTMENT (OUTPATIENT)
Age: 81
End: 2021-04-27

## 2021-04-27 ENCOUNTER — APPOINTMENT (OUTPATIENT)
Dept: FAMILY MEDICINE | Facility: CLINIC | Age: 81
End: 2021-04-27
Payer: MEDICARE

## 2021-04-27 VITALS
WEIGHT: 163 LBS | SYSTOLIC BLOOD PRESSURE: 145 MMHG | HEART RATE: 72 BPM | DIASTOLIC BLOOD PRESSURE: 79 MMHG | HEIGHT: 71 IN | OXYGEN SATURATION: 98 % | TEMPERATURE: 98 F | BODY MASS INDEX: 22.82 KG/M2

## 2021-04-27 DIAGNOSIS — Z00.00 ENCOUNTER FOR GENERAL ADULT MEDICAL EXAMINATION W/OUT ABNORMAL FINDINGS: ICD-10-CM

## 2021-04-27 DIAGNOSIS — K59.09 OTHER CONSTIPATION: ICD-10-CM

## 2021-04-27 DIAGNOSIS — Z87.891 PERSONAL HISTORY OF NICOTINE DEPENDENCE: ICD-10-CM

## 2021-04-27 DIAGNOSIS — Z82.49 FAMILY HISTORY OF ISCHEMIC HEART DISEASE AND OTHER DISEASES OF THE CIRCULATORY SYSTEM: ICD-10-CM

## 2021-04-27 DIAGNOSIS — Z78.9 OTHER SPECIFIED HEALTH STATUS: ICD-10-CM

## 2021-04-27 PROCEDURE — 99387 INIT PM E/M NEW PAT 65+ YRS: CPT | Mod: 25,GY

## 2021-04-27 PROCEDURE — 93000 ELECTROCARDIOGRAM COMPLETE: CPT | Mod: 59

## 2021-04-27 PROCEDURE — G0439: CPT

## 2021-04-27 PROCEDURE — G0442 ANNUAL ALCOHOL SCREEN 15 MIN: CPT | Mod: 59

## 2021-04-27 PROCEDURE — 99213 OFFICE O/P EST LOW 20 MIN: CPT | Mod: 25

## 2021-04-27 NOTE — HEALTH RISK ASSESSMENT
[Good] : ~his/her~  mood as  good [] : No [Yes] : Yes [2 - 4 times a month (2 pts)] : 2-4 times a month (2 points) [1 or 2 (0 pts)] : 1 or 2 (0 points) [Never (0 pts)] : Never (0 points) [No] : In the past 12 months have you used drugs other than those required for medical reasons? No [0] : 2) Feeling down, depressed, or hopeless: Not at all (0) [YearQuit] : 1990 [Audit-CScore] : 2 [de-identified] : walking [de-identified] : fruits, veggies  [Patient reported colonoscopy was normal] : Patient reported colonoscopy was normal [HIV test declined] : HIV test declined [Hepatitis C test declined] : Hepatitis C test declined [Change in mental status noted] : No change in mental status noted [Language] : denies difficulty with language [None] : None [With Significant Other] : lives with significant other [Retired] : retired [] :  [Sexually Active] : sexually active [High Risk Behavior] : no high risk behavior [Feels Safe at Home] : Feels safe at home [Fully functional (bathing, dressing, toileting, transferring, walking, feeding)] : Fully functional (bathing, dressing, toileting, transferring, walking, feeding) [Fully functional (using the telephone, shopping, preparing meals, housekeeping, doing laundry, using] : Fully functional and needs no help or supervision to perform IADLs (using the telephone, shopping, preparing meals, housekeeping, doing laundry, using transportation, managing medications and managing finances) [Reports changes in hearing] : Reports no changes in hearing [Reports changes in vision] : Reports no changes in vision [ColonoscopyDate] : 01/19

## 2021-04-27 NOTE — HISTORY OF PRESENT ILLNESS
[FreeTextEntry1] : establish care \par ed follow up \par constipation \par phlegm  [de-identified] : 82 yo m presents to establish care. \par Presents with his wife. \par Status post dental surgery a few weeks ago, was seen in the ED after anesthesia. Does not recall why was brought to the ED aside from having bruises and a broken nose-- does not remember the sx or ambulance ride. \par Mentioned cough and phlegm in chest. \par Also mentioned constipation x 3 days.

## 2021-04-27 NOTE — PLAN
[FreeTextEntry1] : follow up labs, labs drawn in office \par \par cough \par follow up cxr\par r/o lung path \par \par constipation \par started miralax \par gi visit pending \par \par parkinsons \par follows with neurology

## 2021-05-04 LAB
25(OH)D3 SERPL-MCNC: 30.8 NG/ML
ALBUMIN SERPL ELPH-MCNC: 4.5 G/DL
ALP BLD-CCNC: 59 U/L
ALT SERPL-CCNC: 9 U/L
ANION GAP SERPL CALC-SCNC: 15 MMOL/L
AST SERPL-CCNC: 17 U/L
BASOPHILS # BLD AUTO: 0.03 K/UL
BASOPHILS NFR BLD AUTO: 0.6 %
BILIRUB SERPL-MCNC: 0.6 MG/DL
BUN SERPL-MCNC: 19 MG/DL
CALCIUM SERPL-MCNC: 10.2 MG/DL
CHLORIDE SERPL-SCNC: 100 MMOL/L
CHOLEST SERPL-MCNC: 197 MG/DL
CO2 SERPL-SCNC: 24 MMOL/L
CREAT SERPL-MCNC: 0.98 MG/DL
EOSINOPHIL # BLD AUTO: 0.14 K/UL
EOSINOPHIL NFR BLD AUTO: 2.7 %
ESTIMATED AVERAGE GLUCOSE: 134 MG/DL
GLUCOSE SERPL-MCNC: 123 MG/DL
HBA1C MFR BLD HPLC: 6.3 %
HCT VFR BLD CALC: 44.9 %
HDLC SERPL-MCNC: 56 MG/DL
HGB BLD-MCNC: 14 G/DL
IMM GRANULOCYTES NFR BLD AUTO: 0.2 %
LDLC SERPL CALC-MCNC: 126 MG/DL
LYMPHOCYTES # BLD AUTO: 1.56 K/UL
LYMPHOCYTES NFR BLD AUTO: 29.9 %
MAN DIFF?: NORMAL
MCHC RBC-ENTMCNC: 29.4 PG
MCHC RBC-ENTMCNC: 31.2 GM/DL
MCV RBC AUTO: 94.3 FL
MONOCYTES # BLD AUTO: 0.41 K/UL
MONOCYTES NFR BLD AUTO: 7.9 %
NEUTROPHILS # BLD AUTO: 3.07 K/UL
NEUTROPHILS NFR BLD AUTO: 58.7 %
NONHDLC SERPL-MCNC: 140 MG/DL
PLATELET # BLD AUTO: 267 K/UL
POTASSIUM SERPL-SCNC: 5.2 MMOL/L
PROT SERPL-MCNC: 7.1 G/DL
RBC # BLD: 4.76 M/UL
RBC # FLD: 12.5 %
SODIUM SERPL-SCNC: 139 MMOL/L
TRIGL SERPL-MCNC: 74 MG/DL
TSH SERPL-ACNC: 2.24 UIU/ML
WBC # FLD AUTO: 5.22 K/UL

## 2021-05-19 ENCOUNTER — APPOINTMENT (OUTPATIENT)
Dept: GASTROENTEROLOGY | Facility: CLINIC | Age: 81
End: 2021-05-19

## 2021-07-16 RX ORDER — LISINOPRIL AND HYDROCHLOROTHIAZIDE TABLETS 10; 12.5 MG/1; MG/1
10-12.5 TABLET ORAL
Qty: 90 | Refills: 0 | Status: DISCONTINUED | COMMUNITY
Start: 2021-04-27 | End: 2021-07-16

## 2021-07-29 ENCOUNTER — APPOINTMENT (OUTPATIENT)
Dept: FAMILY MEDICINE | Facility: CLINIC | Age: 81
End: 2021-07-29

## 2021-08-03 ENCOUNTER — APPOINTMENT (OUTPATIENT)
Dept: FAMILY MEDICINE | Facility: CLINIC | Age: 81
End: 2021-08-03
Payer: MEDICARE

## 2021-08-03 VITALS
RESPIRATION RATE: 16 BRPM | HEART RATE: 69 BPM | TEMPERATURE: 97.3 F | WEIGHT: 163 LBS | SYSTOLIC BLOOD PRESSURE: 164 MMHG | BODY MASS INDEX: 22.82 KG/M2 | OXYGEN SATURATION: 98 % | DIASTOLIC BLOOD PRESSURE: 85 MMHG | HEIGHT: 71 IN

## 2021-08-03 PROCEDURE — 36415 COLL VENOUS BLD VENIPUNCTURE: CPT

## 2021-08-03 PROCEDURE — 99214 OFFICE O/P EST MOD 30 MIN: CPT | Mod: 25

## 2021-08-03 NOTE — PLAN
[FreeTextEntry1] : follow up labs, labs drawn in office  \par encouraged cxr for cough \par renewed meds because patient ran out, strongly encouraged neurology follow up, discussed case with daughter on the phone

## 2021-08-03 NOTE — HISTORY OF PRESENT ILLNESS
[FreeTextEntry1] : follow up cholesterol, sugars \par neurology referral \par cough [de-identified] : 82 yo m presents to discuss labs. \par Elevated cholesterol and sugars last visit, will repeat today. \par Was eating healthier. \par \par Needs refills on inhaler. Never had cxr for cough, encouraged today. \par No blood in cough, only phlegm. \par \par Has not followed up with neurology. Needs refills on medication for parkinson's. Will refill for today. \par Patient aware he needs to set up an appt. with neuro.

## 2021-08-04 LAB
ALBUMIN SERPL ELPH-MCNC: 4.8 G/DL
ALP BLD-CCNC: 63 U/L
ALT SERPL-CCNC: 23 U/L
ANION GAP SERPL CALC-SCNC: 8 MMOL/L
AST SERPL-CCNC: 23 U/L
BILIRUB SERPL-MCNC: 0.5 MG/DL
BUN SERPL-MCNC: 17 MG/DL
CALCIUM SERPL-MCNC: 10.3 MG/DL
CHLORIDE SERPL-SCNC: 100 MMOL/L
CHOLEST SERPL-MCNC: 145 MG/DL
CO2 SERPL-SCNC: 31 MMOL/L
CREAT SERPL-MCNC: 0.96 MG/DL
ESTIMATED AVERAGE GLUCOSE: 140 MG/DL
GLUCOSE SERPL-MCNC: 134 MG/DL
HBA1C MFR BLD HPLC: 6.5 %
HDLC SERPL-MCNC: 51 MG/DL
LDLC SERPL CALC-MCNC: 83 MG/DL
NONHDLC SERPL-MCNC: 94 MG/DL
POTASSIUM SERPL-SCNC: 5.3 MMOL/L
PROT SERPL-MCNC: 7.2 G/DL
SODIUM SERPL-SCNC: 138 MMOL/L
TRIGL SERPL-MCNC: 55 MG/DL

## 2021-10-05 ENCOUNTER — APPOINTMENT (OUTPATIENT)
Dept: FAMILY MEDICINE | Facility: CLINIC | Age: 81
End: 2021-10-05
Payer: MEDICARE

## 2021-10-05 VITALS
OXYGEN SATURATION: 98 % | HEART RATE: 66 BPM | SYSTOLIC BLOOD PRESSURE: 131 MMHG | TEMPERATURE: 97.8 F | HEIGHT: 71 IN | DIASTOLIC BLOOD PRESSURE: 86 MMHG | WEIGHT: 155 LBS | BODY MASS INDEX: 21.7 KG/M2

## 2021-10-05 DIAGNOSIS — Z23 ENCOUNTER FOR IMMUNIZATION: ICD-10-CM

## 2021-10-05 DIAGNOSIS — G20 PARKINSON'S DISEASE: ICD-10-CM

## 2021-10-05 PROCEDURE — 90662 IIV NO PRSV INCREASED AG IM: CPT

## 2021-10-05 PROCEDURE — 36415 COLL VENOUS BLD VENIPUNCTURE: CPT

## 2021-10-05 PROCEDURE — 99214 OFFICE O/P EST MOD 30 MIN: CPT | Mod: 25

## 2021-10-05 PROCEDURE — G0008: CPT

## 2021-10-08 ENCOUNTER — NON-APPOINTMENT (OUTPATIENT)
Age: 81
End: 2021-10-08

## 2021-10-08 LAB
ALBUMIN SERPL ELPH-MCNC: 4.5 G/DL
ALP BLD-CCNC: 64 U/L
ALT SERPL-CCNC: 14 U/L
ANION GAP SERPL CALC-SCNC: 13 MMOL/L
AST SERPL-CCNC: 17 U/L
BASOPHILS # BLD AUTO: 0.03 K/UL
BASOPHILS NFR BLD AUTO: 0.5 %
BILIRUB SERPL-MCNC: 0.4 MG/DL
BUN SERPL-MCNC: 19 MG/DL
CALCIUM SERPL-MCNC: 9.9 MG/DL
CHLORIDE SERPL-SCNC: 100 MMOL/L
CHOLEST SERPL-MCNC: 142 MG/DL
CO2 SERPL-SCNC: 25 MMOL/L
CREAT SERPL-MCNC: 0.94 MG/DL
EOSINOPHIL # BLD AUTO: 0.15 K/UL
EOSINOPHIL NFR BLD AUTO: 2.3 %
ESTIMATED AVERAGE GLUCOSE: 140 MG/DL
GLUCOSE SERPL-MCNC: 137 MG/DL
HBA1C MFR BLD HPLC: 6.5 %
HCT VFR BLD CALC: 43.2 %
HDLC SERPL-MCNC: 50 MG/DL
HGB BLD-MCNC: 13.7 G/DL
IMM GRANULOCYTES NFR BLD AUTO: 0.2 %
LDLC SERPL CALC-MCNC: 80 MG/DL
LYMPHOCYTES # BLD AUTO: 1.39 K/UL
LYMPHOCYTES NFR BLD AUTO: 21.4 %
MAN DIFF?: NORMAL
MCHC RBC-ENTMCNC: 29.6 PG
MCHC RBC-ENTMCNC: 31.7 GM/DL
MCV RBC AUTO: 93.3 FL
MONOCYTES # BLD AUTO: 0.48 K/UL
MONOCYTES NFR BLD AUTO: 7.4 %
NEUTROPHILS # BLD AUTO: 4.43 K/UL
NEUTROPHILS NFR BLD AUTO: 68.2 %
NONHDLC SERPL-MCNC: 92 MG/DL
PLATELET # BLD AUTO: 268 K/UL
POTASSIUM SERPL-SCNC: 4.7 MMOL/L
PROT SERPL-MCNC: 6.9 G/DL
PSA FREE FLD-MCNC: 36 %
PSA FREE SERPL-MCNC: 0.41 NG/ML
PSA SERPL-MCNC: 1.15 NG/ML
RBC # BLD: 4.63 M/UL
RBC # FLD: 12.5 %
SODIUM SERPL-SCNC: 138 MMOL/L
TRIGL SERPL-MCNC: 60 MG/DL
WBC # FLD AUTO: 6.49 K/UL

## 2021-10-12 NOTE — PLAN
[FreeTextEntry1] : follow up labs, labs drawn in office \par reviewed previous labs \par refilled meds, reviewed risks, benefits, alternatives, side effects \par encouraged importance of chest xray \par nurse to engage with patients daughter to review\par will connect pt with neuro and cardio

## 2021-10-12 NOTE — HISTORY OF PRESENT ILLNESS
[FreeTextEntry1] : medication refills  [de-identified] : 82 yo m presents for medication refills and bp check. \par Takes meds daily. \par Has been traveling. \par Feels much better. \par Has yet to follow up with neuro, cardio. \par Here for labs. \par Cough stable, has not yet gone for chest xray. \par

## 2021-10-12 NOTE — PHYSICAL EXAM
[Normal Sclera/Conjunctiva] : normal sclera/conjunctiva [Normal] : affect was normal and insight and judgment were intact [de-identified] : slow, shuffle to gait

## 2021-11-09 ENCOUNTER — APPOINTMENT (OUTPATIENT)
Dept: NEUROLOGY | Facility: CLINIC | Age: 81
End: 2021-11-09

## 2021-11-09 ENCOUNTER — OUTPATIENT (OUTPATIENT)
Dept: OUTPATIENT SERVICES | Facility: HOSPITAL | Age: 81
LOS: 1 days | End: 2021-11-09
Payer: MEDICARE

## 2021-11-09 PROCEDURE — 71046 X-RAY EXAM CHEST 2 VIEWS: CPT | Mod: 26

## 2021-11-09 PROCEDURE — 71046 X-RAY EXAM CHEST 2 VIEWS: CPT

## 2021-11-12 ENCOUNTER — NON-APPOINTMENT (OUTPATIENT)
Age: 81
End: 2021-11-12

## 2021-12-17 ENCOUNTER — APPOINTMENT (OUTPATIENT)
Dept: NEUROLOGY | Facility: CLINIC | Age: 81
End: 2021-12-17

## 2022-01-04 ENCOUNTER — APPOINTMENT (OUTPATIENT)
Dept: FAMILY MEDICINE | Facility: CLINIC | Age: 82
End: 2022-01-04
Payer: MEDICARE

## 2022-01-04 VITALS — SYSTOLIC BLOOD PRESSURE: 130 MMHG | DIASTOLIC BLOOD PRESSURE: 70 MMHG

## 2022-01-04 VITALS
DIASTOLIC BLOOD PRESSURE: 90 MMHG | OXYGEN SATURATION: 99 % | SYSTOLIC BLOOD PRESSURE: 155 MMHG | HEIGHT: 71 IN | HEART RATE: 51 BPM | WEIGHT: 156 LBS | TEMPERATURE: 97.5 F | BODY MASS INDEX: 21.84 KG/M2

## 2022-01-04 DIAGNOSIS — E11.65 TYPE 2 DIABETES MELLITUS WITH HYPERGLYCEMIA: ICD-10-CM

## 2022-01-04 PROCEDURE — 99214 OFFICE O/P EST MOD 30 MIN: CPT | Mod: 25

## 2022-01-04 PROCEDURE — 36415 COLL VENOUS BLD VENIPUNCTURE: CPT

## 2022-01-04 NOTE — HISTORY OF PRESENT ILLNESS
[FreeTextEntry1] : bp check \par med refills  [de-identified] : 80 yo m presents for follow up. \par Takes meds daily, needs refills. \par Here for repeat blood work as well. \par No complaints today. \par

## 2022-01-04 NOTE — PLAN
[FreeTextEntry1] : follow up labs, labs drawn in office \par cont. meds\par discussed importance of healthy lifestyles \par reviewed ekg, will send patient to cardio\par will try to connect patient with sooner neuro appt. \par reviewed xray with patient, pending pulm visit \par \par

## 2022-01-11 ENCOUNTER — APPOINTMENT (OUTPATIENT)
Dept: PULMONOLOGY | Facility: CLINIC | Age: 82
End: 2022-01-11

## 2022-01-11 LAB
ALBUMIN SERPL ELPH-MCNC: 4.4 G/DL
ALP BLD-CCNC: 60 U/L
ALT SERPL-CCNC: 14 U/L
ANION GAP SERPL CALC-SCNC: 11 MMOL/L
AST SERPL-CCNC: 19 U/L
BILIRUB SERPL-MCNC: 0.5 MG/DL
BUN SERPL-MCNC: 20 MG/DL
CALCIUM SERPL-MCNC: 9.7 MG/DL
CHLORIDE SERPL-SCNC: 100 MMOL/L
CHOLEST SERPL-MCNC: 139 MG/DL
CO2 SERPL-SCNC: 27 MMOL/L
CREAT SERPL-MCNC: 0.95 MG/DL
ESTIMATED AVERAGE GLUCOSE: 140 MG/DL
GLUCOSE SERPL-MCNC: 116 MG/DL
HBA1C MFR BLD HPLC: 6.5 %
HDLC SERPL-MCNC: 51 MG/DL
LDLC SERPL CALC-MCNC: 76 MG/DL
NONHDLC SERPL-MCNC: 88 MG/DL
POTASSIUM SERPL-SCNC: 4.5 MMOL/L
PROT SERPL-MCNC: 6.7 G/DL
SODIUM SERPL-SCNC: 139 MMOL/L
TRIGL SERPL-MCNC: 60 MG/DL

## 2022-01-18 ENCOUNTER — APPOINTMENT (OUTPATIENT)
Dept: PULMONOLOGY | Facility: CLINIC | Age: 82
End: 2022-01-18
Payer: MEDICARE

## 2022-01-18 ENCOUNTER — LABORATORY RESULT (OUTPATIENT)
Age: 82
End: 2022-01-18

## 2022-01-18 VITALS
HEIGHT: 71 IN | RESPIRATION RATE: 12 BRPM | WEIGHT: 153 LBS | BODY MASS INDEX: 21.42 KG/M2 | DIASTOLIC BLOOD PRESSURE: 80 MMHG | HEART RATE: 71 BPM | OXYGEN SATURATION: 96 % | SYSTOLIC BLOOD PRESSURE: 121 MMHG | TEMPERATURE: 97.6 F

## 2022-01-18 DIAGNOSIS — J30.2 OTHER SEASONAL ALLERGIC RHINITIS: ICD-10-CM

## 2022-01-18 PROCEDURE — 99203 OFFICE O/P NEW LOW 30 MIN: CPT

## 2022-01-18 NOTE — REVIEW OF SYSTEMS
[Negative] : Endocrine [Cough] : no cough [Dyspnea] : no dyspnea [SOB on Exertion] : no sob on exertion [Chest Discomfort] : no chest discomfort [GERD] : no gerd [TextBox_69] : coughs while drinking water

## 2022-01-18 NOTE — PHYSICAL EXAM
[No Acute Distress] : no acute distress [Normal Oropharynx] : normal oropharynx [Normal Appearance] : normal appearance [No Neck Mass] : no neck mass [Normal Rate/Rhythm] : normal rate/rhythm [Normal S1, S2] : normal s1, s2 [No Murmurs] : no murmurs [No Resp Distress] : no resp distress [Clear to Auscultation Bilaterally] : clear to auscultation bilaterally [No Abnormalities] : no abnormalities [Benign] : benign [Normal Gait] : normal gait [No Cyanosis] : no cyanosis [No Edema] : no edema [FROM] : FROM [Normal Color/ Pigmentation] : normal color/ pigmentation [Oriented x3] : oriented x3 [Normal Affect] : normal affect [TextBox_11] : erythema noted in the posterior pharynx, saliva pooling at the back of the throat. Dryness of the nasal mucosa, no polyps, no discharge [TextBox_105] : 2+ clubbing, 2+ thickening on the dorsum of the hands [TextBox_132] : no cogwheel rigidity noted, no tremors. No past pointing, no nystagmus. Parkinsonian features noted. Lack of expression

## 2022-01-18 NOTE — ASSESSMENT
[FreeTextEntry1] : 1-18-22:\par \par It was a pleasure to meet Severo today in consultation. His respiratory issues are summarized:\par \par 1. Chronic cough\par Sierra complains of a chronic cough. He brings up clear phlegm. \par \par Possible causes include:\par a. Asthma. He has a long history of asthma for over 40 years. He is currently only using rescue MDI, which he has not needed in 2 weeks. His coughing may be a manifestation of asthma. We will order PFT, 6MWT to evaluate for obstructive lung defect. We will also check eosinophil count today along with IgE level.\par \par b. Post nasal drip. He has a history of allergies. He has been using Flonase. We will have him stop the Flonase and switch to nasal sinus rinse with budesonide nebs twice daily.\par \par c. Sinusitis. His sinuses look normal on CT. This is unlikely to be the cause of his cough.\par \par d. Aspiration. He has Parkinson's and may be aspirating. We will order a swallow evaluation with modified barium swallow.\par \par 2. Abnormal chest x-ray\par We reviewed chest x-ray from 11/12/21 with the patient. There is bilateral apical scarring, worse on the right side. We reviewed the CT of the neck from 4/6/21, which shows scarring of the right upper lobe. 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 He states he was treated for several months in and out of the hospital. He denies fever, night sweats, weight loss.\par \par Plan:\par - We would like Sierra to try to find prior chest x-rays from when he had active TB so we can compare them\par - We will order chest CT without contrast to evaluate the entire lung windows\par \par Return to clinic: 3 months

## 2022-01-18 NOTE — HISTORY OF PRESENT ILLNESS
[TextBox_4] : 81 yo M pmhx HTN, high cholesterol, arthritis, DM type II, BPH, Parkinson's\par referred by Dr. Doni Dominguez\par He coughs when he drinks water\par He does not have a neurologist\par He has had asthma for 40 years\par He has not been hospitalized for his asthma in the past 10 years. He was taking advair, but stopped a year ago\par He has allergies, uses Flonase, clear \par He has gotten the COVID vaccine and booster\par no family history of lung cancer\par He uses a stationary bike 2-3 times a week for 20 minutes\par \par Meds: advair 250/50 (NOT CURRENTLY TAKING), tamsulosin, Losartan, rosuvastatin, aspirin, metformin, carbidopa/levodopa, HCTZ,

## 2022-01-18 NOTE — CONSULT LETTER
[Dear  ___] : Dear ~DIONNE, [Consult Letter:] : I had the pleasure of evaluating your patient, [unfilled]. [Consult Closing:] : Thank you very much for allowing me to participate in the care of this patient.  If you have any questions, please do not hesitate to contact me. [Sincerely,] : Sincerely, [FreeTextEntry2] : Doni Dominguez MD\[facility]

## 2022-01-19 ENCOUNTER — NON-APPOINTMENT (OUTPATIENT)
Age: 82
End: 2022-01-19

## 2022-01-19 LAB
BASOPHILS # BLD AUTO: 0.02 K/UL
BASOPHILS NFR BLD AUTO: 0.3 %
EOSINOPHIL # BLD AUTO: 0.13 K/UL
EOSINOPHIL NFR BLD AUTO: 1.6 %
HCT VFR BLD CALC: 44.9 %
HGB BLD-MCNC: 14.5 G/DL
IMM GRANULOCYTES NFR BLD AUTO: 0.1 %
LYMPHOCYTES # BLD AUTO: 1.54 K/UL
LYMPHOCYTES NFR BLD AUTO: 19.4 %
MAN DIFF?: NORMAL
MCHC RBC-ENTMCNC: 29.4 PG
MCHC RBC-ENTMCNC: 32.3 GM/DL
MCV RBC AUTO: 91.1 FL
MONOCYTES # BLD AUTO: 0.87 K/UL
MONOCYTES NFR BLD AUTO: 11 %
NEUTROPHILS # BLD AUTO: 5.35 K/UL
NEUTROPHILS NFR BLD AUTO: 67.6 %
PLATELET # BLD AUTO: 356 K/UL
RBC # BLD: 4.93 M/UL
RBC # FLD: 12.1 %
WBC # FLD AUTO: 7.92 K/UL

## 2022-01-20 ENCOUNTER — NON-APPOINTMENT (OUTPATIENT)
Age: 82
End: 2022-01-20

## 2022-01-20 LAB
A ALTERNATA IGE QN: <0.1 KUA/L
A FUMIGATUS IGE QN: <0.1 KUA/L
C ALBICANS IGE QN: <0.1 KUA/L
C HERBARUM IGE QN: <0.1 KUA/L
CAT DANDER IGE QN: 0.43 KUA/L
COMMON RAGWEED IGE QN: 6.15 KUA/L
D FARINAE IGE QN: <0.1 KUA/L
D PTERONYSS IGE QN: <0.1 KUA/L
DEPRECATED A ALTERNATA IGE RAST QL: 0
DEPRECATED A FUMIGATUS IGE RAST QL: 0
DEPRECATED C ALBICANS IGE RAST QL: 0
DEPRECATED C HERBARUM IGE RAST QL: 0
DEPRECATED CAT DANDER IGE RAST QL: 1
DEPRECATED COMMON RAGWEED IGE RAST QL: 3
DEPRECATED D FARINAE IGE RAST QL: 0
DEPRECATED D PTERONYSS IGE RAST QL: 0
DEPRECATED DOG DANDER IGE RAST QL: 2
DEPRECATED M RACEMOSUS IGE RAST QL: 0
DEPRECATED ROACH IGE RAST QL: NORMAL
DEPRECATED TIMOTHY IGE RAST QL: 0
DEPRECATED WHITE OAK IGE RAST QL: 1
DOG DANDER IGE QN: 0.89 KUA/L
M RACEMOSUS IGE QN: <0.1 KUA/L
ROACH IGE QN: 0.14 KUA/L
TIMOTHY IGE QN: <0.1 KUA/L
WHITE OAK IGE QN: 0.49 KUA/L

## 2022-01-21 ENCOUNTER — APPOINTMENT (OUTPATIENT)
Dept: NEUROLOGY | Facility: CLINIC | Age: 82
End: 2022-01-21
Payer: MEDICARE

## 2022-01-21 VITALS
HEART RATE: 68 BPM | SYSTOLIC BLOOD PRESSURE: 129 MMHG | WEIGHT: 151 LBS | DIASTOLIC BLOOD PRESSURE: 82 MMHG | BODY MASS INDEX: 21.14 KG/M2 | TEMPERATURE: 98 F | OXYGEN SATURATION: 97 % | HEIGHT: 71 IN

## 2022-01-21 PROCEDURE — 99204 OFFICE O/P NEW MOD 45 MIN: CPT

## 2022-01-21 NOTE — HISTORY OF PRESENT ILLNESS
[FreeTextEntry1] : Referred by Dr. Prasad for management of Parkinson's disease.\par \par First noticed symptoms about 4 years ago, left hand was moving very slowly.  Saw his PCP at the time Dr. Mix and was started on Stalevo 50/200/200 mg BID 3 years ago and feels like it is helping, moves more slowly when he does not take it.  Takes it twice daily at 10 AM and 10 PM.  If he wakes up in the middle of the night to urinate he has trouble moving.  Wakes up around 6-7 AM, but reports that he does not notice much slowing or trouble moving between 7 and 10 AM.  Does not notice a clear dropoff in motor function during the day or evening.  Other than slowing of movements, biggest complaint is difficulty swallowing and choking on water, swallow evaluation is pending.  No side effects from Stalevo (dyskinesias, nausea, hallucinations, impulsivity) though he does talk in his sleep.

## 2022-01-21 NOTE — PHYSICAL EXAM
[FreeTextEntry1] : General: no apparent distress\par Mental Status: awake and alert, speech fluent and prosodic with no paraphasic errors in Kazakh and English\par Cranial Nerves: masked facies, PERRL, EOMI, face symmetric, +hypophonia, +dysarthria, tongue midline\par Motor: increased tone in UE bilaterally (L > R), no tremor, no cogwheeling, +bradykinesia on finger taps bilaterally\par Coordination: no dysmetria on finger-nose-finger testing\par Gait: narrow base, normal stance and stride, decreased arm swing especially on L, no freezing, normal turns, no Romberg

## 2022-01-21 NOTE — REASON FOR VISIT
[Initial Evaluation] : an initial evaluation [Family Member] : family member [FreeTextEntry1] : Parkinson's disease

## 2022-01-21 NOTE — CONSULT LETTER
[Dear  ___] : Dear  [unfilled], [Consult Letter:] : I had the pleasure of evaluating your patient, [unfilled]. [Please see my note below.] : Please see my note below. [Consult Closing:] : Thank you very much for allowing me to participate in the care of this patient.  If you have any questions, please do not hesitate to contact me. [Sincerely,] : Sincerely, [FreeTextEntry2] : Drake Prasad MD [FreeTextEntry3] : Sara Gregg MD

## 2022-01-21 NOTE — ASSESSMENT
[FreeTextEntry1] : Parkinson's disease\par \par He has significant parkinsonism on exam and would likely benefit from an increase in medication.  Stalevno is not a typical choice for first-line treatment, particularly in the absence of clear wearing off episodes, but as he is tolerating it well, will continue for now and increase to TID (8 AM, 3 PM, 10 PM).  Will refer to movement disorders for further medication adjustments.\par \par Agree with swallow evaluation.

## 2022-01-31 ENCOUNTER — APPOINTMENT (OUTPATIENT)
Dept: CT IMAGING | Facility: HOSPITAL | Age: 82
End: 2022-01-31

## 2022-02-03 ENCOUNTER — NON-APPOINTMENT (OUTPATIENT)
Age: 82
End: 2022-02-03

## 2022-02-03 ENCOUNTER — OUTPATIENT (OUTPATIENT)
Dept: OUTPATIENT SERVICES | Facility: HOSPITAL | Age: 82
LOS: 1 days | End: 2022-02-03
Payer: MEDICARE

## 2022-02-03 ENCOUNTER — APPOINTMENT (OUTPATIENT)
Dept: HEART AND VASCULAR | Facility: CLINIC | Age: 82
End: 2022-02-03
Payer: MEDICARE

## 2022-02-03 ENCOUNTER — APPOINTMENT (OUTPATIENT)
Dept: CT IMAGING | Facility: HOSPITAL | Age: 82
End: 2022-02-03

## 2022-02-03 VITALS
BODY MASS INDEX: 21.42 KG/M2 | TEMPERATURE: 98 F | HEIGHT: 71 IN | SYSTOLIC BLOOD PRESSURE: 120 MMHG | DIASTOLIC BLOOD PRESSURE: 78 MMHG | HEART RATE: 70 BPM | WEIGHT: 153 LBS | OXYGEN SATURATION: 98 %

## 2022-02-03 PROCEDURE — 71250 CT THORAX DX C-: CPT | Mod: 26,ME

## 2022-02-03 PROCEDURE — 93000 ELECTROCARDIOGRAM COMPLETE: CPT

## 2022-02-03 PROCEDURE — G1004: CPT

## 2022-02-03 PROCEDURE — 99203 OFFICE O/P NEW LOW 30 MIN: CPT

## 2022-02-03 PROCEDURE — 71250 CT THORAX DX C-: CPT | Mod: ME

## 2022-02-03 NOTE — ASSESSMENT
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 Will obtain echo to assess for calcification of the mitral and aortic valves and there respective annuli.

## 2022-02-03 NOTE — REASON FOR VISIT
[FreeTextEntry1] : 81 y/o M with HTN, HLD(LDL 76), DM (A1c 6.5), mildly dilated Ao, Mild coronary calcification on chest CT, abnormal chest CT, Parkinson's Dz

## 2022-04-12 ENCOUNTER — APPOINTMENT (OUTPATIENT)
Dept: PULMONOLOGY | Facility: CLINIC | Age: 82
End: 2022-04-12
Payer: MEDICARE

## 2022-04-26 ENCOUNTER — APPOINTMENT (OUTPATIENT)
Dept: PULMONOLOGY | Facility: CLINIC | Age: 82
End: 2022-04-26
Payer: MEDICARE

## 2022-04-26 ENCOUNTER — LABORATORY RESULT (OUTPATIENT)
Age: 82
End: 2022-04-26

## 2022-04-26 VITALS
HEART RATE: 61 BPM | DIASTOLIC BLOOD PRESSURE: 78 MMHG | TEMPERATURE: 96.4 F | SYSTOLIC BLOOD PRESSURE: 123 MMHG | HEIGHT: 71 IN | OXYGEN SATURATION: 99 % | WEIGHT: 154 LBS | BODY MASS INDEX: 21.56 KG/M2

## 2022-04-26 DIAGNOSIS — Z87.09 PERSONAL HISTORY OF OTHER DISEASES OF THE RESPIRATORY SYSTEM: ICD-10-CM

## 2022-04-26 DIAGNOSIS — R20.9 UNSPECIFIED DISTURBANCES OF SKIN SENSATION: ICD-10-CM

## 2022-04-26 DIAGNOSIS — R93.89 ABNORMAL FINDINGS ON DIAGNOSTIC IMAGING OF OTHER SPECIFIED BODY STRUCTURES: ICD-10-CM

## 2022-04-26 PROCEDURE — 99214 OFFICE O/P EST MOD 30 MIN: CPT

## 2022-04-27 ENCOUNTER — NON-APPOINTMENT (OUTPATIENT)
Age: 82
End: 2022-04-27

## 2022-04-27 LAB
BASOPHILS # BLD AUTO: 0.03 K/UL
BASOPHILS NFR BLD AUTO: 0.3 %
C3 SERPL-MCNC: 116 MG/DL
C4 SERPL-MCNC: 32 MG/DL
CK SERPL-CCNC: 109 U/L
CRP SERPL-MCNC: <3 MG/L
DEPRECATED D DIMER PPP IA-ACNC: 174 NG/ML DDU
EOSINOPHIL # BLD AUTO: 0.12 K/UL
EOSINOPHIL NFR BLD AUTO: 1.4 %
ERYTHROCYTE [SEDIMENTATION RATE] IN BLOOD BY WESTERGREN METHOD: 29 MM/HR
HCT VFR BLD CALC: 45.7 %
HGB BLD-MCNC: 14.3 G/DL
IMM GRANULOCYTES NFR BLD AUTO: 0.2 %
LYMPHOCYTES # BLD AUTO: 1.86 K/UL
LYMPHOCYTES NFR BLD AUTO: 21.4 %
MAN DIFF?: NORMAL
MCHC RBC-ENTMCNC: 29.5 PG
MCHC RBC-ENTMCNC: 31.3 GM/DL
MCV RBC AUTO: 94.2 FL
MONOCYTES # BLD AUTO: 0.8 K/UL
MONOCYTES NFR BLD AUTO: 9.2 %
NEUTROPHILS # BLD AUTO: 5.87 K/UL
NEUTROPHILS NFR BLD AUTO: 67.5 %
NT-PROBNP SERPL-MCNC: 24 PG/ML
PLATELET # BLD AUTO: 273 K/UL
RBC # BLD: 4.85 M/UL
RBC # FLD: 12.6 %
RHEUMATOID FACT SER QL: <10 IU/ML
WBC # FLD AUTO: 8.7 K/UL

## 2022-04-28 ENCOUNTER — NON-APPOINTMENT (OUTPATIENT)
Age: 82
End: 2022-04-28

## 2022-04-28 LAB
ACE BLD-CCNC: 38 U/L
ALDOLASE SERPL-CCNC: 6 U/L
CCP AB SER IA-ACNC: <8 UNITS
CH50 SERPL-MCNC: 64 U/ML
DSDNA AB SER-ACNC: <12 IU/ML
RF+CCP IGG SER-IMP: NEGATIVE

## 2022-04-28 NOTE — HISTORY OF PRESENT ILLNESS
[TextBox_4] : 83 yo M pmhx HTN, high cholesterol, arthritis, DM type II, BPH, Parkinson's\par referred by Dr. Doni Dominguez\par He coughs when he drinks water\par He does not have a neurologist\par He has had asthma for 40 years\par He has not been hospitalized for his asthma in the past 10 years. He was taking advair, but stopped a year ago\par He has allergies, uses Flonase, clear \par He has gotten the COVID vaccine and booster\par no family history of lung cancer\par He uses a stationary bike 2-3 times a week for 20 minutes\par \par Meds: advair 250/50 (NOT CURRENTLY TAKING), tamsulosin, Losartan, rosuvastatin, aspirin, metformin, carbidopa/levodopa, HCTZ\par \par 4-26-22:\par He is not complaining of cough\par He has been using nasal sinus rinse with budesonide nebs twice daily.

## 2022-04-28 NOTE — PROCEDURE
[FreeTextEntry1] : Impression:\par \par 1. Evidence of remote pleural and parenchymal scarring consistent with the provided history of remote tuberculosis. No evidence of reactivation or active tuberculosis in this examination.\par 2. Bilateral subcentimeter and solid micronodules the largest measuring 5 mm within the apical posterior segment of the left upper lobe. In a low risk individual, no additional imaging surveillance is indicated. If this patient is high risk for the development of lung carcinoma, optional 12 month surveillance thoracic CT is suggested.\par 3. Clustered nonsolid nodules within the left lower lobe measuring up to 6 mm. As per Fleischner society 2017 guidelines, interval surveillance at 3-6 months to confirm persistence.\par 4. 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

## 2022-04-28 NOTE — REVIEW OF SYSTEMS
[Cough] : no cough [Dyspnea] : no dyspnea [SOB on Exertion] : no sob on exertion [Chest Discomfort] : no chest discomfort [GERD] : no gerd [TextBox_69] : coughs while drinking water

## 2022-04-28 NOTE — ASSESSMENT
[FreeTextEntry1] : 4-26-22:\par \par It was a pleasure to see Severo today in follow up. His respiratory issues are summarized:\par \par 1. Chronic cough\par Last visit Severo complained of a chronic cough with clear phlegm. Today, he reports he has been using nasal sinus rinse with budesonide nebs twice daily and the cough is gone.\par \par Possible causes include:\par a. Asthma. He has a long history of asthma for over 40 years. He is currently only using rescue MDI, which he has not needed in 2 weeks. His coughing may be a manifestation of asthma. We ordered PFT, 6MWT to evaluate for obstructive lung defect. He has not had this done. He has multiple allergies on RAST, normal eosinophil count and IgE level.\par \par b. Post nasal drip. He has a history of allergies. He has been using nasal sinus rinse with budesonide nebs twice daily.\par \par c. Sinusitis. His sinuses look normal on CT. This is unlikely to be the cause of his cough.\par \par d. Aspiration. He has Parkinson's. We will hold off ordering a swallow evaluation with modified barium swallow as there is nothing on the chest CT to suggest aspiration.\par \par 2. Abnormal chest x-ray\par We reviewed chest x-ray from 11/12/21 with the patient. There is bilateral apical scarring, worse on the right side. We reviewed the CT of the neck from 4/6/21, which shows scarring of the right upper lobe. He states that he had TB and was treated at a hospital that does not exist anymore in the [place] (? [facility]). He states he was treated for several months in and out of the hospital. He denies fever, night sweats, weight loss.\par \par Chest CT 2/3/22 reviewed today with patient. There is scarring noted in the right upper lobe as a result of prior TB. There is evidence of pleural thickening. There is mucus in the right lower lobe bronchus. There is minimal scarring at the left base at the dome of the diaphragm.\par \par Plan:\par - We will hold off ordering a repeat chest CT\par - Severo will let us know if he develops cough, fever, night sweats. Then we would discuss ordering chest CT\par \par 3. Cold hands\par His hands are cold on exam. Although there is no thickening of the skin, they are shiny. We will order autoimmune bloodwork today to evaluate for collagen vascular disease.\par \par Return to clinic: 1 year

## 2022-04-28 NOTE — PHYSICAL EXAM
[TextBox_2] : mild kyphosis [TextBox_105] : grade 1 clubbing, hands are cold [TextBox_132] : no cogwheel rigidity noted, no tremors. No past pointing, no nystagmus. Parkinsonian features noted. Lack of expression

## 2022-04-28 NOTE — DISCUSSION/SUMMARY
[FreeTextEntry1] : ATTENDING'S SUMMARY:\par 4-26-22:\par mild kyphosis\par no pallor, no icterus, mild puffiness in the intraorbital areas\par no JVD, no hepatojugular reflux, no HSM\par no cervical adenopathy, no supraclavicular adenopathy\par erythema noted in the posterior pharynx, saliva pooling at the back of the throat. Dryness of the nasal mucosa, no polyps, no discharge\par normal s1/s2, no murmurs, rubs or gallops\par good air entry bilaterally, no wheezing, rhonchi or crackles\par no cyanosis, no clubbing, no articular manifestations, 2+ thickening on the dorsum of the hands\par no cogwheel rigidity noted, no tremors. No past pointing, no nystagmus\par grade 1 clubbing\par Parkinsonian features noted. Lack of expression\par no pedal edema

## 2022-04-29 ENCOUNTER — APPOINTMENT (OUTPATIENT)
Dept: NEUROLOGY | Facility: CLINIC | Age: 82
End: 2022-04-29
Payer: MEDICARE

## 2022-04-29 ENCOUNTER — NON-APPOINTMENT (OUTPATIENT)
Age: 82
End: 2022-04-29

## 2022-04-29 VITALS — SYSTOLIC BLOOD PRESSURE: 117 MMHG | DIASTOLIC BLOOD PRESSURE: 78 MMHG | OXYGEN SATURATION: 100 % | HEART RATE: 73 BPM

## 2022-04-29 VITALS
WEIGHT: 151 LBS | HEART RATE: 67 BPM | TEMPERATURE: 98 F | DIASTOLIC BLOOD PRESSURE: 80 MMHG | SYSTOLIC BLOOD PRESSURE: 133 MMHG | HEIGHT: 71 IN | BODY MASS INDEX: 21.14 KG/M2 | OXYGEN SATURATION: 99 %

## 2022-04-29 LAB
B2 GLYCOPROT1 IGA SERPL IA-ACNC: 6.4 SAU
B2 GLYCOPROT1 IGG SER-ACNC: <5 SGU
B2 GLYCOPROT1 IGM SER-ACNC: <5 SMU
CARDIOLIPIN IGM SER-MCNC: 6 MPL
CARDIOLIPIN IGM SER-MCNC: <5 GPL
HISTONE AB SER QL: 0.5 UNITS

## 2022-04-29 PROCEDURE — 99215 OFFICE O/P EST HI 40 MIN: CPT

## 2022-04-29 NOTE — HISTORY OF PRESENT ILLNESS
ROBIN CALLED ABOUT PT HE IS GETTING REPORTS ABOUT CONCERN FOR PT:    NOT GETTING MEDICATIONS AS NEEDED  NOT GETTING HEALTH CARE WHEN NEEDED  AND PT FEELS LIKE SHE IS BEING HELD HOSTAGE IN HOME      ROBIN WOULD LIKE TO KNOW IF YOU HAVE ANY OF THESE CONCERNS.  HE IS AWARE HOME LIFE IS NOT IDEAL FOR PT BUT NEEDS TO KNOW YOUR CONCERNS FOR PT      Perfecto Sample [phone] [FreeTextEntry1] : 82 year RH or LH male patient was diagnosed with Parkinson's disease about four years ago. He was complaining that his Lt hand was moving very slowly. He was started on Stalevo 50/200/200 bid 3 years ago, and heels it is helping. \par \par Motor: function he is slow, ambulation no issues and does not use a walker or a cane, manual dexterity sometimes he find difficulty buttoning, no change in hand writing, or feeding himself, but he needs some help in  dressing because it takes too much time(ADLs), balance stated to be very bad, but no falls, no tremor noticed, dyskinesia , wearing OFF before the next dose he feels slow\par \par \par \par \par Pt states some changes in volume of voice, sometimes speaks phony. Pt denies any difficulty with swallowing solids or fluids. However, he mentioned sometimes the food cause him to cough. \par \par \par \par Pt states having dizziness, lightheadedness sometimes, but no other near syncopal episodes related to OH. Today BP is sitting 133/80, standing 117/78\par \par Pt states he is not doing PT, OT, or speech therapy. \par He walks 2-3 per week for 30-45 minutes \par \par Pt states sleep is normal with about  8 hrs/night. Pt states he experiences nightmares and vivid dreams. Pt has screaming, kicking, hitting acting out dreams or other related RBD movements. \par Pt states he experiences constipation, he goes to the bathroom every two days, he is taking Prunes juce.\par Pt states he experiences urinary retention/ urgency/ frequency (only if he drinks a lot)\par Pt states he experiences sialorrhea only during sleep. \par Pt states he has noticed a change in memory for recent events but no issues in thinking and attention\par Pt states no hallucinations/ illusions\par Pt states he has had recent changes in vision, for which he used glasses. \par Pt states mood is good\par Pt states he experiences daytime somnolence.\par Pt states having heart racing on sexual activity and ejaculation \par \par \par Social History\par Drinks alcohol on Saturdays \par Not smoker (ex smoker)\par Marijuana use when was young \par \par Medical History\par HTN\par Asthma \par DM\par \par \par Surgical History\par Hernia \par \par Family History\par No PD in family \par \par Current Medications\par Stalevo 50/200/200 9:30 am, 4 pm, 9-10 pm\par metformin 500 bid\par Rosuvastatin 5 mg daily\par Losartan 100 mg daily \par Hydrochlorothiazide 12.5 mg daily\par Aspirin 81 mg daily \par Prunes juice

## 2022-04-29 NOTE — ASSESSMENT
[FreeTextEntry1] : This 82y old RH male, has PMH of HTN, DM, Asthma presenting with slowness of movements and parkinsonian features that started 4 years ago, with some progression over the years.\par He was started on Stalevo 200 BID when first diagnosed with PD, with some modest response, and recently the dose has been increased to 200 TID, also with modest response.\par On exam, he has significant bradykinesia and rigidity, with no significant tremor. 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.\par \par Plan: \par - MR head wo contrast \par - Continue the same current treatment as if this is a vascular parkinsonism there will be no or only minimal benefit from higher dose of L-Dopa\par - Start Melatonin 5 mg QHS for RBD\par Patient will be traveling to [place] at the fred of May for 2-3 months.\par - Start PT when he is back from his trip\par \par \par Discussed treatment, therapeutic plan, and prognosis, and patient was counseled on lifestyle, coping, and physical and emotional wellbeing.\par \par \par Encouraged to increase exercise and physical activity and maintain an active social and intellectual life.\par \par \par More than 50% of the visit were dedicated to review of treatment, therapeutic plan, and prognosis, and patient was counseled on coping and physical and emotional wellbeing.\par

## 2022-04-29 NOTE — END OF VISIT
[] : Resident [Time Spent: ___ minutes] : I have spent [unfilled] minutes of time on the encounter. [FreeTextEntry3] : Resident present and participated to visit. History, examination, and assessment and plan discussed with resident.\par

## 2022-04-29 NOTE — PHYSICAL EXAM
[FreeTextEntry1] : Patient with normal cognitive function, mild hypomimia with diminished blinking, mild reduction of voice volume but no significant dysarthria.\par There is mild rigidity with some cogwheeling, present bilaterally, more evident on the left side.\par No rest tremor was noted, but kinetic tremor on the LT arm. \par There is mild loss of motor dexterity, with decrement at rapid sequential and rapid alternating movements, also slightly more evident on the left side.\par Foot tapping is slightly impaired, also with the left side more affected.\par Posture is mildly stooped, with some shortening of gait stride and mild asymmetry with left reduction, and decrease arm swing, more evident on the left side.No trouble standing from a seated position with arms crossed. No trouble with turns, no freezing of gait. \par Postural reflexes are normal.\par Normal sensation\par Cerebellar sings: FTN/HST normal \par Down going planter, reflexes +2 all over \par

## 2022-05-01 ENCOUNTER — NON-APPOINTMENT (OUTPATIENT)
Age: 82
End: 2022-05-01

## 2022-05-01 LAB
ANA SER IF-ACNC: NEGATIVE
CENTROMERE IGG SER-ACNC: <0.2 CD:130001892
DEPRECATED CARDIOLIPIN IGA SER: <5 APL
ENA RNP AB SER IA-ACNC: <0.2 AL
ENA SCL70 IGG SER IA-ACNC: <0.2 AL
ENA SM AB SER IA-ACNC: <0.2 AL
ENA SS-A AB SER IA-ACNC: <0.2 AL
ENA SS-B AB SER IA-ACNC: <0.2 AL

## 2022-05-02 ENCOUNTER — NON-APPOINTMENT (OUTPATIENT)
Age: 82
End: 2022-05-02

## 2022-05-02 LAB — RNA POLYMERASE III IGG: 10 UNITS

## 2022-05-12 ENCOUNTER — NON-APPOINTMENT (OUTPATIENT)
Age: 82
End: 2022-05-12

## 2022-05-12 LAB
EJ AB SER QL: NEGATIVE
ENA JO1 AB SER IA-ACNC: <20 UNITS
ENA PM/SCL AB SER-ACNC: <20 UNITS
ENA SM+RNP AB SER IA-ACNC: <20 UNITS
ENA SS-A IGG SER QL: <20 UNITS
FIBRILLARIN AB SER QL: NEGATIVE
KU AB SER QL: NEGATIVE
MDA-5 (P140)(CADM-140): <20 UNITS
MI2 AB SER QL: NEGATIVE
NXP-2 (P140): <20 UNITS
OJ AB SER QL: NEGATIVE
PL12 AB SER QL: NEGATIVE
PL7 AB SER QL: NEGATIVE
SRP AB SERPL QL: NEGATIVE
TIF GAMMA (P155/140): <20 UNITS
U2 SNRNP AB SER QL: NEGATIVE

## 2022-07-11 ENCOUNTER — RX RENEWAL (OUTPATIENT)
Age: 82
End: 2022-07-11

## 2022-08-09 ENCOUNTER — APPOINTMENT (OUTPATIENT)
Dept: NEUROLOGY | Facility: CLINIC | Age: 82
End: 2022-08-09

## 2022-08-09 VITALS — HEART RATE: 67 BPM | SYSTOLIC BLOOD PRESSURE: 103 MMHG | DIASTOLIC BLOOD PRESSURE: 68 MMHG

## 2022-08-09 VITALS
BODY MASS INDEX: 19.88 KG/M2 | SYSTOLIC BLOOD PRESSURE: 113 MMHG | TEMPERATURE: 97.8 F | HEIGHT: 71 IN | WEIGHT: 142 LBS | HEART RATE: 61 BPM | DIASTOLIC BLOOD PRESSURE: 74 MMHG | OXYGEN SATURATION: 97 %

## 2022-08-09 PROCEDURE — 99215 OFFICE O/P EST HI 40 MIN: CPT

## 2022-08-12 ENCOUNTER — APPOINTMENT (OUTPATIENT)
Dept: NEUROLOGY | Facility: CLINIC | Age: 82
End: 2022-08-12

## 2022-08-30 ENCOUNTER — APPOINTMENT (OUTPATIENT)
Dept: MRI IMAGING | Facility: HOSPITAL | Age: 82
End: 2022-08-30

## 2022-08-30 ENCOUNTER — OUTPATIENT (OUTPATIENT)
Dept: OUTPATIENT SERVICES | Facility: HOSPITAL | Age: 82
LOS: 1 days | End: 2022-08-30
Payer: MEDICARE

## 2022-08-30 PROCEDURE — 70551 MRI BRAIN STEM W/O DYE: CPT

## 2022-08-30 PROCEDURE — 70551 MRI BRAIN STEM W/O DYE: CPT | Mod: 26,MH

## 2022-09-07 ENCOUNTER — APPOINTMENT (OUTPATIENT)
Dept: NUCLEAR MEDICINE | Facility: HOSPITAL | Age: 82
End: 2022-09-07

## 2022-09-07 ENCOUNTER — OUTPATIENT (OUTPATIENT)
Dept: OUTPATIENT SERVICES | Facility: HOSPITAL | Age: 82
LOS: 1 days | End: 2022-09-07
Payer: MEDICARE

## 2022-09-07 PROCEDURE — 78803 RP LOCLZJ TUM SPECT 1 AREA: CPT | Mod: 26,MH

## 2022-09-07 PROCEDURE — A9584: CPT

## 2022-09-07 PROCEDURE — 78803 RP LOCLZJ TUM SPECT 1 AREA: CPT

## 2022-09-23 ENCOUNTER — APPOINTMENT (OUTPATIENT)
Dept: NEUROLOGY | Facility: CLINIC | Age: 82
End: 2022-09-23

## 2022-09-23 VITALS — SYSTOLIC BLOOD PRESSURE: 102 MMHG | HEART RATE: 84 BPM | DIASTOLIC BLOOD PRESSURE: 70 MMHG | OXYGEN SATURATION: 95 %

## 2022-09-23 VITALS
TEMPERATURE: 98.3 F | HEIGHT: 71 IN | WEIGHT: 141 LBS | BODY MASS INDEX: 19.74 KG/M2 | SYSTOLIC BLOOD PRESSURE: 122 MMHG | HEART RATE: 73 BPM | DIASTOLIC BLOOD PRESSURE: 78 MMHG | OXYGEN SATURATION: 95 %

## 2022-09-23 PROCEDURE — 99215 OFFICE O/P EST HI 40 MIN: CPT

## 2022-10-17 NOTE — PATIENT PROFILE ADULT - NSPROHMDIABETBLDGLCTST_GEN_A_NUR
Adderall 20mg, 1 tab po bid,   Last ordered 9/13/22, 60 tabs, 0 refills    Alprazolam 0.5mh, 1 tab po 3x daily prn sleep or anxiety,  Last ordered 9/13/22, 30 tabs, 0 refills    LOV: 9/26/22 for ADHD follow up.    PDMP reviewed. Alprazolam 0.5mg last dispensed 9/13/22 for 30 tabs a 10 day supply. Adderall 20mg last dispensed 9/13/22 for 60 tabs, a 30 day supply.    daily

## 2022-11-03 ENCOUNTER — RX RENEWAL (OUTPATIENT)
Age: 82
End: 2022-11-03

## 2022-11-18 ENCOUNTER — NON-APPOINTMENT (OUTPATIENT)
Age: 82
End: 2022-11-18

## 2022-11-18 ENCOUNTER — APPOINTMENT (OUTPATIENT)
Dept: NEUROLOGY | Facility: CLINIC | Age: 82
End: 2022-11-18

## 2022-11-18 PROCEDURE — 99442: CPT | Mod: NC,95

## 2022-11-18 PROCEDURE — 99443: CPT | Mod: NC,95

## 2022-11-22 RX ORDER — ALBUTEROL SULFATE 90 UG/1
108 (90 BASE) INHALANT RESPIRATORY (INHALATION)
Qty: 1 | Refills: 2 | Status: DISCONTINUED | COMMUNITY
Start: 2021-04-27 | End: 2022-11-22

## 2022-12-21 ENCOUNTER — APPOINTMENT (OUTPATIENT)
Dept: PULMONOLOGY | Facility: CLINIC | Age: 82
End: 2022-12-21

## 2022-12-28 ENCOUNTER — INPATIENT (INPATIENT)
Facility: HOSPITAL | Age: 82
LOS: 1 days | Discharge: NURSING, REHAB OR LONG-TERM CARE | DRG: 56 | End: 2022-12-30
Attending: INTERNAL MEDICINE | Admitting: STUDENT IN AN ORGANIZED HEALTH CARE EDUCATION/TRAINING PROGRAM
Payer: MEDICARE

## 2022-12-28 VITALS
DIASTOLIC BLOOD PRESSURE: 67 MMHG | TEMPERATURE: 98 F | OXYGEN SATURATION: 97 % | HEART RATE: 78 BPM | SYSTOLIC BLOOD PRESSURE: 108 MMHG | RESPIRATION RATE: 18 BRPM | WEIGHT: 139.99 LBS

## 2022-12-28 PROCEDURE — 99285 EMERGENCY DEPT VISIT HI MDM: CPT

## 2022-12-28 PROCEDURE — 70450 CT HEAD/BRAIN W/O DYE: CPT | Mod: 26,MA,59

## 2022-12-28 PROCEDURE — 70498 CT ANGIOGRAPHY NECK: CPT | Mod: 26,MA

## 2022-12-28 PROCEDURE — 99222 1ST HOSP IP/OBS MODERATE 55: CPT

## 2022-12-28 PROCEDURE — 70496 CT ANGIOGRAPHY HEAD: CPT | Mod: 26,MA

## 2022-12-28 NOTE — ED ADULT TRIAGE NOTE - BEFAST SPEECH PHRASE
Yes I spent 35 minutes of critical care time with this patient. This does not include time spent on separately reported billable procedures.

## 2022-12-28 NOTE — ED ADULT NURSE NOTE - OBJECTIVE STATEMENT
as per family pt states that his left hand went limp yesterday and he also lost control of his bladder and bowel , hx stroke in November

## 2022-12-28 NOTE — ED ADULT NURSE NOTE - ED STAT RN HANDOFF DETAILS
report given to TASHI Conte for continuation of care. admission discussed with pt and family. verbalize understanding. Pt still needing IV bolus. no acute distress noted. Transport at bedside to take pt to the floor.

## 2022-12-28 NOTE — ED ADULT TRIAGE NOTE - CHIEF COMPLAINT QUOTE
Pt states his left hand "went limp" yesterday morning, with loss of bowel and bladder control at the time. Pt hospitalized for possible stroke at the end of November. Denies any change in symptoms over past 24 hours. Ambulates only with two assist.

## 2022-12-29 ENCOUNTER — APPOINTMENT (OUTPATIENT)
Dept: NEUROLOGY | Facility: CLINIC | Age: 82
End: 2022-12-29

## 2022-12-29 DIAGNOSIS — G20 PARKINSON'S DISEASE: ICD-10-CM

## 2022-12-29 DIAGNOSIS — I63.9 CEREBRAL INFARCTION, UNSPECIFIED: ICD-10-CM

## 2022-12-29 DIAGNOSIS — J15.9 UNSPECIFIED BACTERIAL PNEUMONIA: ICD-10-CM

## 2022-12-29 DIAGNOSIS — R53.1 WEAKNESS: ICD-10-CM

## 2022-12-29 DIAGNOSIS — R63.8 OTHER SYMPTOMS AND SIGNS CONCERNING FOOD AND FLUID INTAKE: ICD-10-CM

## 2022-12-29 DIAGNOSIS — J45.909 UNSPECIFIED ASTHMA, UNCOMPLICATED: ICD-10-CM

## 2022-12-29 DIAGNOSIS — I10 ESSENTIAL (PRIMARY) HYPERTENSION: ICD-10-CM

## 2022-12-29 DIAGNOSIS — E11.9 TYPE 2 DIABETES MELLITUS WITHOUT COMPLICATIONS: ICD-10-CM

## 2022-12-29 LAB
A1C WITH ESTIMATED AVERAGE GLUCOSE RESULT: 6.4 % — HIGH (ref 4–5.6)
ANION GAP SERPL CALC-SCNC: 11 MMOL/L — SIGNIFICANT CHANGE UP (ref 5–17)
APPEARANCE UR: CLEAR — SIGNIFICANT CHANGE UP
BASOPHILS # BLD AUTO: 0.04 K/UL — SIGNIFICANT CHANGE UP (ref 0–0.2)
BASOPHILS NFR BLD AUTO: 0.4 % — SIGNIFICANT CHANGE UP (ref 0–2)
BILIRUB UR-MCNC: NEGATIVE — SIGNIFICANT CHANGE UP
BUN SERPL-MCNC: 13 MG/DL — SIGNIFICANT CHANGE UP (ref 7–23)
C DIFF GDH STL QL: SIGNIFICANT CHANGE UP
C DIFF GDH STL QL: SIGNIFICANT CHANGE UP
CALCIUM SERPL-MCNC: 9.5 MG/DL — SIGNIFICANT CHANGE UP (ref 8.4–10.5)
CHLORIDE SERPL-SCNC: 94 MMOL/L — LOW (ref 96–108)
CO2 SERPL-SCNC: 26 MMOL/L — SIGNIFICANT CHANGE UP (ref 22–31)
COLOR SPEC: YELLOW — SIGNIFICANT CHANGE UP
CREAT SERPL-MCNC: 0.62 MG/DL — SIGNIFICANT CHANGE UP (ref 0.5–1.3)
DIFF PNL FLD: NEGATIVE — SIGNIFICANT CHANGE UP
EGFR: 97 ML/MIN/1.73M2 — SIGNIFICANT CHANGE UP
EOSINOPHIL # BLD AUTO: 0.18 K/UL — SIGNIFICANT CHANGE UP (ref 0–0.5)
EOSINOPHIL NFR BLD AUTO: 1.9 % — SIGNIFICANT CHANGE UP (ref 0–6)
ESTIMATED AVERAGE GLUCOSE: 137 MG/DL — HIGH (ref 68–114)
FERRITIN SERPL-MCNC: 162 NG/ML — SIGNIFICANT CHANGE UP (ref 30–400)
GI PCR PANEL: SIGNIFICANT CHANGE UP
GLUCOSE BLDC GLUCOMTR-MCNC: 122 MG/DL — HIGH (ref 70–99)
GLUCOSE BLDC GLUCOMTR-MCNC: 129 MG/DL — HIGH (ref 70–99)
GLUCOSE BLDC GLUCOMTR-MCNC: 135 MG/DL — HIGH (ref 70–99)
GLUCOSE BLDC GLUCOMTR-MCNC: 184 MG/DL — HIGH (ref 70–99)
GLUCOSE SERPL-MCNC: 130 MG/DL — HIGH (ref 70–99)
GLUCOSE UR QL: NEGATIVE — SIGNIFICANT CHANGE UP
HCT VFR BLD CALC: 33.6 % — LOW (ref 39–50)
HGB BLD-MCNC: 11.1 G/DL — LOW (ref 13–17)
HIV 1+2 AB+HIV1 P24 AG SERPL QL IA: SIGNIFICANT CHANGE UP
IMM GRANULOCYTES NFR BLD AUTO: 0.3 % — SIGNIFICANT CHANGE UP (ref 0–0.9)
IRON SATN MFR SERPL: 33 UG/DL — LOW (ref 45–165)
KETONES UR-MCNC: ABNORMAL MG/DL
LEGIONELLA AG UR QL: NEGATIVE — SIGNIFICANT CHANGE UP
LEUKOCYTE ESTERASE UR-ACNC: NEGATIVE — SIGNIFICANT CHANGE UP
LYMPHOCYTES # BLD AUTO: 1.04 K/UL — SIGNIFICANT CHANGE UP (ref 1–3.3)
LYMPHOCYTES # BLD AUTO: 11 % — LOW (ref 13–44)
MAGNESIUM SERPL-MCNC: 1.5 MG/DL — LOW (ref 1.6–2.6)
MCHC RBC-ENTMCNC: 28.6 PG — SIGNIFICANT CHANGE UP (ref 27–34)
MCHC RBC-ENTMCNC: 33 GM/DL — SIGNIFICANT CHANGE UP (ref 32–36)
MCV RBC AUTO: 86.6 FL — SIGNIFICANT CHANGE UP (ref 80–100)
MONOCYTES # BLD AUTO: 0.88 K/UL — SIGNIFICANT CHANGE UP (ref 0–0.9)
MONOCYTES NFR BLD AUTO: 9.3 % — SIGNIFICANT CHANGE UP (ref 2–14)
NEUTROPHILS # BLD AUTO: 7.29 K/UL — SIGNIFICANT CHANGE UP (ref 1.8–7.4)
NEUTROPHILS NFR BLD AUTO: 77.1 % — HIGH (ref 43–77)
NITRITE UR-MCNC: NEGATIVE — SIGNIFICANT CHANGE UP
NRBC # BLD: 0 /100 WBCS — SIGNIFICANT CHANGE UP (ref 0–0)
PH UR: 6.5 — SIGNIFICANT CHANGE UP (ref 5–8)
PHOSPHATE SERPL-MCNC: 2.7 MG/DL — SIGNIFICANT CHANGE UP (ref 2.5–4.5)
PLATELET # BLD AUTO: 440 K/UL — HIGH (ref 150–400)
POTASSIUM SERPL-MCNC: 3.6 MMOL/L — SIGNIFICANT CHANGE UP (ref 3.5–5.3)
POTASSIUM SERPL-SCNC: 3.6 MMOL/L — SIGNIFICANT CHANGE UP (ref 3.5–5.3)
PROCALCITONIN SERPL-MCNC: 0.07 NG/ML — SIGNIFICANT CHANGE UP (ref 0.02–0.1)
PROT UR-MCNC: NEGATIVE MG/DL — SIGNIFICANT CHANGE UP
RAPID RVP RESULT: SIGNIFICANT CHANGE UP
RBC # BLD: 3.88 M/UL — LOW (ref 4.2–5.8)
RBC # FLD: 12.5 % — SIGNIFICANT CHANGE UP (ref 10.3–14.5)
S PNEUM AG UR QL: NEGATIVE — SIGNIFICANT CHANGE UP
SARS-COV-2 RNA SPEC QL NAA+PROBE: SIGNIFICANT CHANGE UP
SODIUM SERPL-SCNC: 131 MMOL/L — LOW (ref 135–145)
SP GR SPEC: 1.01 — SIGNIFICANT CHANGE UP (ref 1–1.03)
TRANSFERRIN SERPL-MCNC: 138 MG/DL — LOW (ref 200–360)
UROBILINOGEN FLD QL: 0.2 E.U./DL — SIGNIFICANT CHANGE UP
WBC # BLD: 9.46 K/UL — SIGNIFICANT CHANGE UP (ref 3.8–10.5)
WBC # FLD AUTO: 9.46 K/UL — SIGNIFICANT CHANGE UP (ref 3.8–10.5)

## 2022-12-29 PROCEDURE — 71045 X-RAY EXAM CHEST 1 VIEW: CPT | Mod: 26

## 2022-12-29 PROCEDURE — 70551 MRI BRAIN STEM W/O DYE: CPT | Mod: 26

## 2022-12-29 PROCEDURE — 99233 SBSQ HOSP IP/OBS HIGH 50: CPT | Mod: GC

## 2022-12-29 RX ORDER — ATORVASTATIN CALCIUM 80 MG/1
80 TABLET, FILM COATED ORAL AT BEDTIME
Refills: 0 | Status: DISCONTINUED | OUTPATIENT
Start: 2022-12-29 | End: 2022-12-30

## 2022-12-29 RX ORDER — INSULIN LISPRO 100/ML
VIAL (ML) SUBCUTANEOUS
Refills: 0 | Status: DISCONTINUED | OUTPATIENT
Start: 2022-12-29 | End: 2022-12-30

## 2022-12-29 RX ORDER — TAMSULOSIN HYDROCHLORIDE 0.4 MG/1
0.4 CAPSULE ORAL AT BEDTIME
Refills: 0 | Status: DISCONTINUED | OUTPATIENT
Start: 2022-12-29 | End: 2022-12-30

## 2022-12-29 RX ORDER — ASPIRIN/CALCIUM CARB/MAGNESIUM 324 MG
81 TABLET ORAL DAILY
Refills: 0 | Status: DISCONTINUED | OUTPATIENT
Start: 2022-12-29 | End: 2022-12-30

## 2022-12-29 RX ORDER — ALBUTEROL 90 UG/1
2 AEROSOL, METERED ORAL
Qty: 0 | Refills: 0 | DISCHARGE

## 2022-12-29 RX ORDER — CARBIDOPA AND LEVODOPA 25; 100 MG/1; MG/1
1 TABLET ORAL
Qty: 0 | Refills: 0 | DISCHARGE

## 2022-12-29 RX ORDER — CHOLECALCIFEROL (VITAMIN D3) 125 MCG
1 CAPSULE ORAL
Qty: 0 | Refills: 0 | DISCHARGE

## 2022-12-29 RX ORDER — AZITHROMYCIN 500 MG/1
500 TABLET, FILM COATED ORAL EVERY 24 HOURS
Refills: 0 | Status: DISCONTINUED | OUTPATIENT
Start: 2022-12-29 | End: 2022-12-29

## 2022-12-29 RX ORDER — BRIMONIDINE TARTRATE 2 MG/MG
1 SOLUTION/ DROPS OPHTHALMIC
Refills: 0 | Status: DISCONTINUED | OUTPATIENT
Start: 2022-12-29 | End: 2022-12-30

## 2022-12-29 RX ORDER — CARBIDOPA, LEVODOPA, AND ENTACAPONE 50; 200; 200 MG/1; MG/1; MG/1
1 TABLET, FILM COATED ORAL
Refills: 0 | Status: DISCONTINUED | OUTPATIENT
Start: 2022-12-29 | End: 2022-12-30

## 2022-12-29 RX ORDER — ASPIRIN/CALCIUM CARB/MAGNESIUM 324 MG
1 TABLET ORAL
Qty: 0 | Refills: 0 | DISCHARGE

## 2022-12-29 RX ORDER — CEFTRIAXONE 500 MG/1
1000 INJECTION, POWDER, FOR SOLUTION INTRAMUSCULAR; INTRAVENOUS EVERY 24 HOURS
Refills: 0 | Status: DISCONTINUED | OUTPATIENT
Start: 2022-12-29 | End: 2022-12-29

## 2022-12-29 RX ORDER — DOCUSATE SODIUM 100 MG
1 CAPSULE ORAL
Qty: 0 | Refills: 0 | DISCHARGE

## 2022-12-29 RX ORDER — ENOXAPARIN SODIUM 100 MG/ML
40 INJECTION SUBCUTANEOUS EVERY 24 HOURS
Refills: 0 | Status: DISCONTINUED | OUTPATIENT
Start: 2022-12-29 | End: 2022-12-30

## 2022-12-29 RX ORDER — CARBIDOPA, LEVODOPA, AND ENTACAPONE 50; 200; 200 MG/1; MG/1; MG/1
1 TABLET, FILM COATED ORAL
Qty: 0 | Refills: 0 | DISCHARGE

## 2022-12-29 RX ORDER — CARBIDOPA AND LEVODOPA 25; 100 MG/1; MG/1
0 TABLET ORAL
Qty: 0 | Refills: 0 | DISCHARGE

## 2022-12-29 RX ORDER — MELOXICAM 15 MG/1
1 TABLET ORAL
Qty: 0 | Refills: 0 | DISCHARGE

## 2022-12-29 RX ORDER — TAMSULOSIN HYDROCHLORIDE 0.4 MG/1
1 CAPSULE ORAL
Qty: 0 | Refills: 0 | DISCHARGE

## 2022-12-29 RX ORDER — GLUCAGON INJECTION, SOLUTION 0.5 MG/.1ML
1 INJECTION, SOLUTION SUBCUTANEOUS ONCE
Refills: 0 | Status: DISCONTINUED | OUTPATIENT
Start: 2022-12-29 | End: 2022-12-30

## 2022-12-29 RX ORDER — SODIUM CHLORIDE 9 MG/ML
500 INJECTION INTRAMUSCULAR; INTRAVENOUS; SUBCUTANEOUS ONCE
Refills: 0 | Status: COMPLETED | OUTPATIENT
Start: 2022-12-29 | End: 2022-12-29

## 2022-12-29 RX ORDER — CARBIDOPA AND LEVODOPA 25; 100 MG/1; MG/1
1 TABLET ORAL
Refills: 0 | Status: DISCONTINUED | OUTPATIENT
Start: 2022-12-29 | End: 2022-12-30

## 2022-12-29 RX ORDER — BRIMONIDINE TARTRATE 2 MG/MG
1 SOLUTION/ DROPS OPHTHALMIC
Qty: 0 | Refills: 0 | DISCHARGE

## 2022-12-29 RX ORDER — CHOLECALCIFEROL (VITAMIN D3) 125 MCG
1000 CAPSULE ORAL DAILY
Refills: 0 | Status: DISCONTINUED | OUTPATIENT
Start: 2022-12-29 | End: 2022-12-30

## 2022-12-29 RX ORDER — ALBUTEROL 90 UG/1
2 AEROSOL, METERED ORAL EVERY 6 HOURS
Refills: 0 | Status: DISCONTINUED | OUTPATIENT
Start: 2022-12-29 | End: 2022-12-30

## 2022-12-29 RX ORDER — ATORVASTATIN CALCIUM 80 MG/1
1 TABLET, FILM COATED ORAL
Qty: 0 | Refills: 0 | DISCHARGE

## 2022-12-29 RX ORDER — LOSARTAN POTASSIUM 100 MG/1
100 TABLET, FILM COATED ORAL DAILY
Refills: 0 | Status: DISCONTINUED | OUTPATIENT
Start: 2022-12-29 | End: 2022-12-30

## 2022-12-29 RX ORDER — SODIUM CHLORIDE 9 MG/ML
1000 INJECTION, SOLUTION INTRAVENOUS
Refills: 0 | Status: DISCONTINUED | OUTPATIENT
Start: 2022-12-29 | End: 2022-12-30

## 2022-12-29 RX ADMIN — CEFTRIAXONE 100 MILLIGRAM(S): 500 INJECTION, POWDER, FOR SOLUTION INTRAMUSCULAR; INTRAVENOUS at 04:59

## 2022-12-29 RX ADMIN — Medication 81 MILLIGRAM(S): at 13:04

## 2022-12-29 RX ADMIN — AZITHROMYCIN 500 MILLIGRAM(S): 500 TABLET, FILM COATED ORAL at 04:59

## 2022-12-29 RX ADMIN — Medication 2: at 13:02

## 2022-12-29 RX ADMIN — LOSARTAN POTASSIUM 100 MILLIGRAM(S): 100 TABLET, FILM COATED ORAL at 06:06

## 2022-12-29 RX ADMIN — CARBIDOPA, LEVODOPA, AND ENTACAPONE 1 TABLET(S): 50; 200; 200 TABLET, FILM COATED ORAL at 07:59

## 2022-12-29 RX ADMIN — Medication 1000 UNIT(S): at 13:03

## 2022-12-29 RX ADMIN — BRIMONIDINE TARTRATE 1 DROP(S): 2 SOLUTION/ DROPS OPHTHALMIC at 06:05

## 2022-12-29 RX ADMIN — ENOXAPARIN SODIUM 40 MILLIGRAM(S): 100 INJECTION SUBCUTANEOUS at 06:06

## 2022-12-29 RX ADMIN — CARBIDOPA AND LEVODOPA 1 TABLET(S): 25; 100 TABLET ORAL at 06:05

## 2022-12-29 RX ADMIN — CARBIDOPA, LEVODOPA, AND ENTACAPONE 1 TABLET(S): 50; 200; 200 TABLET, FILM COATED ORAL at 16:43

## 2022-12-29 RX ADMIN — BRIMONIDINE TARTRATE 1 DROP(S): 2 SOLUTION/ DROPS OPHTHALMIC at 22:43

## 2022-12-29 RX ADMIN — SODIUM CHLORIDE 500 MILLILITER(S): 9 INJECTION INTRAMUSCULAR; INTRAVENOUS; SUBCUTANEOUS at 03:47

## 2022-12-29 NOTE — CONSULT NOTE ADULT - ASSESSMENT
per Internal Medicine    82 y o M PMH asthma, DM, HTN, HLD, Parkinson's disease, CVA x 2 (2018, residual deficits of left with left hand and shoulder weakness, 2022, residual deficits of left wrist drop and weakness - currently takes ASA 81mg daily), presenting to [facility] ED on 12/28 with chief complaint of worsening left hand weakness x1 day with concern for recrudescence of recent stroke in the setting of pneumonia.    Problem/Plan - 1:  ·  Problem: Left-sided weakness.   ·  Plan: Pt was hospitalized in November for a new stroke with clinical findings of left wrist drop. Per chart review, pt received tPA at [facility] on 11/16. Pt was in hospital for extended time due to pneumonia and when d/c'ed had poor functional status and dependent of ADLs. L hand strength and flexibility was improving but on 12/28, pt had more weakness in L hand than normal. Denies numbness, tingling, or change in baseline mental status  - Stroke code called in ED and pt found to have NIHSS 1 for left facial droop (wife and son reported it was chronic to neuro team but said it was new to medicine team)   - CTA head and neck without steno-occlusive disease, but does demonstrate small layering right pleural effusion as well as nonspecific consolidative opacities superior right lower lobe, which could represent pneumonia.   - NCHCT with chronic left MCA territory infarct with surrounding encephalomalacia and gliosis, unchanged as well as chronic right thalamic lacunar infarct. No acute findings.  Plan:  - per stroke, likely recrudescence of recent stroke in the setting of possible pneumonia. however, given the fact that is unclear if L facial droop is new, will obtain MR brain  - f/u MR brain  - f/u stroke recs  - continue lipitor 80mg, asa 81  - treat pna as below  - will also give 500cc NS in the setting of mild hyponatremia that may indicate hypovolemia  - PT/OT.    Problem/Plan - 2:  ·  Problem: Hospital-acquired bacterial pneumonia.   ·  Plan: Pt recently admitted to hospital in 11/2022 for stroke with course c/b pneumonia s/p treatment. Pt's cough initially improved after leaving the hospital, but 2 weeks ago he started to develop worsening cough productive of yellow sputum. Otherwise denies fever, chills, chest pain, sob, n/v/d/c, sore throat, rhinorrhea. On the day prior to admission, pt was extremely fatigued and slept most of the day.  - CTA head and neck showing small layering right pleural effusion as well as nonspecific consolidative opacities superior right lower lobe, which could represent pneumonia  Plan:  - unclear if CT findings are residual from recent pneumonia or acute. however, given the fact that pt is having worsening of his stroke symptoms will treat for HAP  - will start ceftriaxone and azithromycin for now as pt not septic, can broaden if not clinically improving  - f/u urine strep and legionella  - f/u procal.    Problem/Plan - 3:  ·  Problem: Stroke.   ·  Plan: - as above.    Problem/Plan - 4:  ·  Problem: HTN (hypertension).   ·  Plan: Home med: losartan 100/HCTZ 12.5  Plan:  - hold HCTZ 12.5 in the setting of relative hypotension and hyponatremia  - continue home losartan 100mg.    Problem/Plan - 5:  ·  Problem: Diabetes.   ·  Plan: Home med: metformin 500mg BID  Plan:  - hold metformin while i/p  - ISS  - f/u a1c.    Problem/Plan - 6:  ·  Problem: Parkinson's disease.   ·  Plan: - continue home sinimet  in AM, carbidopa-ENTA-levodopa -200 TID at 8am, 3pm, 10pm.    Problem/Plan - 7:  ·  Problem: Asthma.   ·  Plan: - continue home albuterol prn.    Problem/Plan - 8:  ·  Problem: Nutrition, metabolism, and development symptoms.   ·  Plan: F: 500cc NS  E: replete prn  N: regular  DVT ppx: lovenox  Code status: full code.

## 2022-12-29 NOTE — ED PROVIDER NOTE - CLINICAL SUMMARY MEDICAL DECISION MAKING FREE TEXT BOX
82y PMHx of asthma, DM, HTN, HLD, Parkinson's disease, CVA x 2  w complaints of L wrist drop and L sided weakness since yesterday morning upon wakening. Family states that L wrist drop occurred on last stroke one month ago at [facility]. Currently on asa as only ac. Denies f/c, abd pain, n/v, diarrhea. Some dry cough.  Ddx: new CVA/recrudescence, other infectious/lyte etiology  -labs, CT head/CTA, UA/cxr, reassess.  S/o to Dr Paiz and ISAMAR Royal pending imaging, neuro consult, ua/cxr. 82y PMHx of asthma, DM, HTN, HLD, Parkinson's disease, CVA x 2  w complaints of L wrist drop since yesterday morning upon wakening. Family states that L wrist drop occurred on last stroke one month ago at [facility]. Currently on asa as only ac. Denies f/c, abd pain, n/v, diarrhea. Some dry cough.  Ddx: new CVA/recrudescence, other infectious/lyte etiology  -labs, CT head/CTA, UA/cxr, reassess.  S/o to Dr Paiz and ISAMAR Royal pending imaging, neuro consult, ua/cxr. 82y PMHx of asthma, DM, HTN, HLD, Parkinson's disease, CVA x 2  w complaints of L wrist drop since yesterday morning upon wakening. Family states that L wrist drop occurred on last stroke one month ago at [facility]. Currently on asa as only ac. Denies f/c, abd pain, n/v, diarrhea. Some dry cough.  Ddx: new CVA/recrudescence, other infectious /lyte etiology  -labs, CT head/CTA, UA/cxr, reassess.  S/o to Dr Paiz and ISAMAR Royal pending imaging, neuro consult, ua/cxr.

## 2022-12-29 NOTE — H&P ADULT - ASSESSMENT
82M PMH asthma, DM, HTN, HLD, Parkinson's disease, CVA x 2 (2018, residual deficits of left with left hand and shoulder weakness, 2022, residual deficits of left wrist drop and weakness - currently takes ASA 81mg daily), presenting to [facility] ED on 12/28 with chief complaint of worsening left hand weakness x1 day with concern for recrudescence of recent stroke in the setting of pneumonia.     Pt was hospitalized in November for a new stroke with clinical findings of left wrist drop which has since then been improving. Of note, he had a prolonged hospital course 2/2 PNA. Since being discharged from the hospital, pt has required more help at home, needing assistance to ambulate and perform ADLs. Pt had cough with pneumonia in the hospital but that initially improved and pt's L hand was getting stronger. However, pt started to develop worsening cough productive of yellow sputum 2 weeks ago. Otherwise denies fever, chills, chest pain, sob, n/v/d/c, sore throat, rhinorrhea. On the day prior to admission, pt was extremely fatigued and slept most of the day. Pt was also found to have increasing weakness of L hand and therefore presented to ED. Pt denies numbness, tingling, or change in baseline mental status.    On review of patient's outside records, received tPA AT [facility] on 11/16 for left hand weakness.     ED Course:  VS: T 98, HR 78, /67, O2 97%  Labs s/f: WBC 10.7, Hb 11.2  Stroke code was called and pt found to have NIHSS 1 for left facial droop (wife and son reported it was chronic to neuro team but said it was new to medicine team)  CTA head and neck without steno-occlusive disease, but does demonstrate small layering right pleural effusion as well as nonspecific consolidative opacities superior right lower lobe, which could represent pneumonia.   NCHCT with chronic left MCA territory infarct with surrounding encephalomalacia and gliosis, unchanged as well as chronic right thalamic lacunar infarct. No acute findings.  82M PMH asthma, DM, HTN, HLD, Parkinson's disease, CVA x 2 (2018, residual deficits of left with left hand and shoulder weakness, 2022, residual deficits of left wrist drop and weakness - currently takes ASA 81mg daily), presenting to [facility] ED on 12/28 with chief complaint of worsening left hand weakness x1 day with concern for recrudescence of recent stroke in the setting of pneumonia.

## 2022-12-29 NOTE — H&P ADULT - NSHPPHYSICALEXAM_GEN_ALL_CORE
VITAL SIGNS:  T(C): 36.7 (12-28-22 @ 19:30), Max: 36.7 (12-28-22 @ 19:30)  T(F): 98 (12-28-22 @ 19:30), Max: 98 (12-28-22 @ 19:30)  HR: 78 (12-28-22 @ 19:30) (78 - 78)  BP: 108/67 (12-28-22 @ 19:30) (108/67 - 108/67)  BP(mean): --  RR: 18 (12-28-22 @ 19:30) (18 - 18)  SpO2: 97% (12-28-22 @ 19:30) (97% - 97%)  Wt(kg): --    PHYSICAL EXAM:    Constitutional: WDWN resting comfortably in bed; NAD  ENT: MMM  Neck: supple  Respiratory: CTA B/L; no W/R/R, no retractions  Cardiac: +S1/S2; RRR; no M/R/G  Gastrointestinal: soft, NT/ND; no rebound or guarding  Extremities: WWP, no clubbing or cyanosis; no peripheral edema  Vascular: 2+ radial, DP pulses B/L  Neurologic: AAOx3; L facial droop with smiling but equal to eye closure and opening; L hand with 0/5 extension at the wrist but 5/5 at the elbow and shoulder, R side and b/l LEs 5/5 throughout  Psychiatric: affect and characteristics of appearance, verbalizations, behaviors are appropriate

## 2022-12-29 NOTE — ED PROVIDER NOTE - HIV OFFER
[facility] Psychiatry Service  June 23, 2022      Behavioral Health Clinician Progress Note    Patient Name: Janelle White           Service Type:  Individual      Service Location:   [facility]     Session Start Time: 08:00 am  Session End Time: 08:25 am      Session Length: 16 - 37      Attendees: Patient     Service Modality:  Video Visit:      Provider verified identity through the following two step process.  Patient provided:  Patient is known previously to provider    Telemedicine Visit: The patient's condition can be safely assessed and treated via synchronous audio and visual telemedicine encounter.      Reason for Telemedicine Visit: Services only offered telehealth    Originating Site (Patient Location): Patient's home    Distant Site (Provider Location): Provider Remote Setting- Home Office    Consent:  The patient/guardian has verbally consented to: the potential risks and benefits of telemedicine (video visit) versus in person care; bill my insurance or make self-payment for services provided; and responsibility for payment of non-covered services.     Patient would like the video invitation sent by:  My Chart    Mode of Communication:  Video Conference via [facility]    As the provider I attest to compliance with applicable laws and regulations related to telemedicine.    Visit Activities (Refresh list every visit): [facility] Only    Diagnostic Assessment Date: 03/11/2021  Treatment Plan Review Date: 07/13/2022  See Flowsheets for today's PHQ-9 and STACIE-7 results  Previous PHQ-9:   PHQ-9 SCORE 5/17/2022 6/5/2022 6/13/2022   PHQ-9 Total Score - - -   PHQ-9 Total Score MyChart - 12 (Moderate depression) 18 (Moderately severe depression)   PHQ-9 Total Score 12 12 18     Previous STACIE-7:   STACIE-7 SCORE 7/27/2021 10/5/2021 6/10/2022   Total Score - - -   Total Score 5 (mild anxiety) 3 (minimal anxiety) 12 (moderate anxiety)   Total Score 5 3 12       JAYRO LEVEL:  JAYRO Score (Last Two)  "7/9/2009 1/27/2011   JAYRO Raw Score 44 50   Activation Score 70.8 86.3   JAYRO Level 4 4       DATA  Extended Session (60+ minutes): No  Interactive Complexity: No  Crisis: No  [facility] Patient: No    Treatment Objective(s) Addressed in This Session:  Target Behavior(s): disease management/lifestyle changes pain management    Depressed Mood:    Anxiety: will develop more effective coping skills to manage anxiety symptoms  Psychological distress related to Pain    Current Stressors / Issues:  Reported that she tested positive for COVID yesterday. This is her second time and said that it is not as severe as the first time. She spoke about her experience with Ketamine noting that she is very pleased with how peaceful she feels with the treatments. \"It's been very relaxing and affirming experience.\" She feels relaxed and in a good mood after the treatment. She finds it easier to bring herself back to that level of relaxation during times of stress. She spoke about her ongoing suicidal ideation explaining that \"when the thoughts enter my mind they are not working thoughts.\" She explained that the suicidal thoughts are just as frequent but less intense, do not last as long, and are less threatening to her than they have been in the past. She was commended for her continued work and efforts to improve herself, and she was encouraged to continue working toward her goals.      04/13/2022:  Does not feel like she received a significant benefit from TMS. She feels she was not having as frequent ruminative thoughts of suicide but that has started to return recently. It tends to happen more often when her physical pain is more intense. She got to a point where she set a date to kill herself but she changed her mind because her son came to spend time with her and she changed her mind saying, \"It just wasn't right yet.\" She noted that she has a few plans but will not discuss them saying, \"I don't want anyone to take those options away " "from me.\"       Progress on Treatment Objective(s) / Homework:  Minimal progress - PREPARATION (Decided to change - considering how); Intervened by negotiating a change plan and determining options / strategies for behavior change, identifying triggers, exploring social supports, and working towards setting a date to begin behavior change    Motivational Interviewing    MI Intervention: Expressed Empathy/Understanding, Permission to raise concern or advise, Open-ended questions and Reflections: simple and complex     Change Talk Expressed by the Patient: Desire to change Reasons to change    Provider Response to Change Talk: E - Evoked more info from patient about behavior change, A - Affirmed patient's thoughts, decisions, or attempts at behavior change, R - Reflected patient's change talk and S - Summarized patient's change talk statements    Also provided psychoeducation about behavioral health condition, symptoms, and treatment options    Care Plan review completed: Yes    Medication Review:  Changes to psychiatric medications, see updated Medication List in EPIC.     Medication Compliance:  Yes    Changes in Health Issues:   None reported    Chemical Use Review:   Substance Use: Chemical use reviewed, no active concerns identified      Tobacco Use: No current tobacco use.      Assessment: Current Emotional / Mental Status (status of significant symptoms):  Risk status (Self / Other harm or suicidal ideation)  Patient has had a history of suicidal ideation: ongoing  Patient denies current fears or concerns for personal safety.  Patient reports the following current or recent suicidal ideation or behaviors: ongoing thoughts, had a plan, but changed her mind last week.  Patient denies current or recent homicidal ideation or behaviors.  Patient denies current or recent self injurious behavior or ideation.  Patient denies other safety concerns.  A safety and risk management plan has been developed including: Patient " consented to co-developed safety plan.  A safety and risk management plan was completed.  Patient agreed to use safety plan should any safety concerns arise.  A copy was given to the patient.    Appearance:   Appropriate   Eye Contact:   Good   Psychomotor Behavior: Normal   Attitude:   Cooperative   Orientation:   All  Speech   Rate / Production: Normal    Volume:  Normal   Mood:    Depressed   Affect:    Appropriate   Thought Content:  Clear   Thought Form:  Coherent  Logical   Insight:    Fair     Diagnoses:  1. Severe episode of recurrent major depressive disorder, without psychotic features (H)    2. Chronic pain syndrome        Collateral Reports Completed:  Communicated with: Dr. Perez    Plan: (Homework, other):  Patient was given information about behavioral services and encouraged to schedule a follow up appointment with [facility] clinic [facility]  She was also given information about mental health symptoms and treatment options .  CD Recommendations: No indications of CD issues.  Matt Manzo PsyD, LP      ______________________________________________________________________    [facility] Psychiatry Service Treatment Plan    Patient's Name: Janelle White  YOB: 1958    Date of Creation: April 13, 2022  Date Treatment Plan Last Reviewed/Revised: April 13, 2022    DSM5 Diagnoses: 296.33 (F33.2) Major Depressive Disorder, Recurrent Episode, Severe _  Psychosocial / Contextual Factors: chronic pain  PROMIS (reviewed every 90 days):   PROMIS 10-Global Health (only subscores and total score):   PROMIS-10 Scores Only 1/13/2022 6/3/2022 6/10/2022   Global Mental Health Score 5 8 5   Global Physical Health Score 10 10 9   PROMIS TOTAL - SUBSCORES 15 18 14       Referral / Collaboration:  Referral to another professional/service is not indicated at this time..      Anticipated number of session for this episode of care: 5-6  Anticipation  "frequency of session: As determined by Dr. Perez  Anticipated Duration of each session: 16-37 minutes  Treatment plan will be reviewed in 90 days or when goals have been changed.       MeasurableTreatment Goal(s) related to diagnosis / functional impairment(s)  Goal 1: Patient will work with providers to manage symptoms    I will know I've met my goal when I can enjoy my life again.      Objective #A (Patient Action)  Patient will attend all appointments, take medication as prescribed.  Status: New - Date: 04/13/2022     Intervention(s)  [facility] will Monitor and assist in overcoming barriers to treatment adherence    Objective #B  Patient will consider all recommendations offered.  Status: New - Date: 04/13/2022      Intervention(s)  [facility] will educate patient on treatment options, clarify concerns, work with pt to overcome any resistance to compliance.      Goal 2: Patient will replace self-harm thoughts/behaviors with self-care activities    I will know I've met my goal when I stop having those thoughts.      Objective #A (Patient Action)                          Status: New - Date: 04/13/2022  Patient will assist in creating safety plan.     Intervention(s)  [facility] will assist in creation of safety plan and provide copy to patient.     Objective #B  Patient will report using safety plan as needed.                       Status: New - Date: 04/13/2022     Intervention(s)  [facility] will monitor safety, use of plan, adjust plan as needed, work through any identified barriers/resistance to following her plan.      Patient has reviewed and agreed to the above plan.      Melvin Manzo PsyD  April 13, 2022            [facility]                                       Patient's Name: Janelle TORRES Christopher  April 13, 2022     SAFETY PLAN:  Step 1: Warning signs / cues (Thoughts, images, mood, situation, behavior) that a crisis may be developing:  ? Thoughts: \"I don't matter\", \"People would be better off " "without me\", \"I can't do this anymore\" and \"I just want this to end\"  ? Images: obsessive thoughts of death or dying: thoughts of carrying out plan  ? Thinking Processes: ruminations (can't stop thinking about my problems): ruminate on my plan and highly critical and negative thoughts: if  says something critical i shut down  ? Mood: worsening depression, hopelessness, disinhibited (not caring about things or consequences) and worthlessness  ? Behaviors: isolating/withdrawing , can't stop crying, not taking care of myself and not taking care of my responsibilities  ? Situations: relationship problems   Step 2: Coping strategies - Things I can do to take my mind off of my problems without contacting another person (relaxation technique, physical activity):  ? Distress Tolerance Strategies:  play with my pet  and watch a funny movie:    ? Physical Activities: go for a walk and get in the jacuzzi  ? Focus on helpful thoughts:  \"This is temporary\"  Step 3: People and social settings that provide distraction:              Name: Alyx     Phone: in my phone              Name: Raven   Phone: in my phone  ? park              Step 4: Remind myself of people and things that are important to me and worth living for:  Children, dog, friends  Step 5: When I am in crisis, I can ask these people to help me use my safety plan:              Name: Alyx     Phone: in my phone              Name: Raven   Phone: in my phone  Step 6: Making the environment safe:   ? none identified  Step 7: Professionals or agencies I can contact during a crisis:  ? Suicide Prevention Lifeline: 8-555-693-TALK (1997)  ? Crisis Text Line Service: Text   HOME  to 177-883.    Local Crisis Services: [facility]     Call 811 or go to my nearest emergency department.       I helped develop this safety plan and agree to use it when needed.  I have been given a copy of this plan.       Patient signature: " _______________________________________________________________  Today s date:  April 13, 2022    Adapted from Safety Plan Template 2008 Antionette Trevino and Joseph Leon is reprinted with the express permission of the authors.  No portion of the Safety Plan Template may be reproduced without the express, written permission.  You can contact the authors at [email or web address] or [email or web address]   Opt out

## 2022-12-29 NOTE — H&P ADULT - PROBLEM SELECTOR PLAN 1
Pt was hospitalized in November for a new stroke with clinical findings of left wrist drop. Per chart review, pt received tPA at [facility] on 11/16. Pt was in hospital for extended time due to pneumonia and when d/c'ed had poor functional status and dependent of ADLs. L hand strength and flexibility was improving but on 12/28, pt had more weakness in L hand than normal. Denies numbness, tingling, or change in baseline mental status  - Stroke code called in ED and pt found to have NIHSS 1 for left facial droop (wife and son reported it was chronic to neuro team but said it was new to medicine team)   - CTA head and neck without steno-occlusive disease, but does demonstrate small layering right pleural effusion as well as nonspecific consolidative opacities superior right lower lobe, which could represent pneumonia.   - NCHCT with chronic left MCA territory infarct with surrounding encephalomalacia and gliosis, unchanged as well as chronic right thalamic lacunar infarct. No acute findings.  Plan:  - per stroke, likely recrudescence of recent stroke in the setting of possible pneumonia. however, given the fact that is unclear if L facial droop is new, will obtain MR brain  - f/u MR brain  - f/u stroke recs  - continue lipitor 80mg, asa 81  - treat pna as below  - will also give 500cc NS in the setting of mild hyponatremia that may indicate hypovolemia Pt was hospitalized in November for a new stroke with clinical findings of left wrist drop. Per chart review, pt received tPA at [facility] on 11/16. Pt was in hospital for extended time due to pneumonia and when d/c'ed had poor functional status and dependent of ADLs. L hand strength and flexibility was improving but on 12/28, pt had more weakness in L hand than normal. Denies numbness, tingling, or change in baseline mental status  - Stroke code called in ED and pt found to have NIHSS 1 for left facial droop (wife and son reported it was chronic to neuro team but said it was new to medicine team)   - CTA head and neck without steno-occlusive disease, but does demonstrate small layering right pleural effusion as well as nonspecific consolidative opacities superior right lower lobe, which could represent pneumonia.   - NCHCT with chronic left MCA territory infarct with surrounding encephalomalacia and gliosis, unchanged as well as chronic right thalamic lacunar infarct. No acute findings.  Plan:  - per stroke, likely recrudescence of recent stroke in the setting of possible pneumonia. however, given the fact that is unclear if L facial droop is new, will obtain MR brain  - f/u MR brain  - f/u stroke recs  - continue lipitor 80mg, asa 81  - treat pna as below  - will also give 500cc NS in the setting of mild hyponatremia that may indicate hypovolemia  - PT/OT

## 2022-12-29 NOTE — PHYSICAL THERAPY INITIAL EVALUATION ADULT - REHAB POTENTIAL, PT EVAL
Patient scheduled for right ureteroscopy with Dr. Ryley Lopez MD on 1/26/2017 at [facility]. Surgery/precert form completed and faxed per protocol. Consent signed by patient and doctor, original sent to scanning. Preop H&P has been scheduled with Dr. Lux on 1/20/2017 at 12:40pm.   good, to achieve stated therapy goals

## 2022-12-29 NOTE — PHYSICAL THERAPY INITIAL EVALUATION ADULT - PERTINENT HX OF CURRENT PROBLEM, REHAB EVAL
82M PMH asthma, DM, HTN, HLD, Parkinson's disease, CVA x 2 (2018, residual deficits of left with left hand and shoulder weakness, 2022, residual deficits of left wrist drop and weakness - currently takes ASA 81mg daily), presenting to [facility] ED on 12/28 with chief complaint of worsening left hand weakness x1 day. Pt was hospitalized in November for a new stroke with clinical findings of left wrist drop which has since then been improving. Of note, he had a prolonged hospital course 2/2 PNA. Since being discharged from the hospital, pt has required more help at home, needing assistance to ambulate and perform ADLs. Pt had cough with pneumonia in the hospital but that initially improved and pt's L hand was getting stronger. However, pt started to develop worsening cough productive of yellow sputum 2 weeks ago. Otherwise denies fever, chills, chest pain, sob, n/v/d/c, sore throat, rhinorrhea. On the day prior to admission, pt was extremely fatigued and slept most of the day. Pt was also found to have increasing weakness of L hand and therefore presented to ED. Pt denies numbness, tingling, or change in baseline mental status.

## 2022-12-29 NOTE — ED PROVIDER NOTE - OBJECTIVE STATEMENT
82y PMHx of asthma, DM, HTN, HLD, Parkinson's disease, CVA x 2  w complaints of L wrist drop and L sided weakness since yesterday morning upon wakening. Family states that L wrist drop occurred on last stroke one month ago at [facility]. Currently on asa as only ac. Denies f/c, abd pain, n/v, diarrhea. Some dry cough. Wife states bowel/urine incontinence over the past four days but no back pain, saddle anesthesia. 82y PMHx of asthma, DM, HTN, HLD, Parkinson's disease, CVA x 2  w complaints of L wrist drop since yesterday morning upon wakening. Family states that L wrist drop occurred on last stroke one month ago at [facility]. Currently on asa as only ac. Denies f/c, abd pain, n/v, diarrhea. Some dry cough. Wife states pt has been generally weak and has had bowel/urine incontinence over the past four days but no back pain, saddle anesthesia.

## 2022-12-29 NOTE — H&P ADULT - ATTENDING COMMENTS
cont antibiotics for Pneumonia and monitor for sepsis  MRI head and consider neuro consult  PT eval and d/c planning

## 2022-12-29 NOTE — H&P ADULT - NSHPLABSRESULTS_GEN_ALL_CORE
.  LABS:                         11.2   10.71 )-----------( 449      ( 28 Dec 2022 20:29 )             33.3     12-28    132<L>  |  94<L>  |  20  ----------------------------<  124<H>  4.2   |  29  |  0.90    Ca    9.3      28 Dec 2022 20:29    TPro  7.3  /  Alb  3.4  /  TBili  0.4  /  DBili  x   /  AST  24  /  ALT  6<L>  /  AlkPhos  104  12-28    PT/INR - ( 28 Dec 2022 20:29 )   PT: 17.8 sec;   INR: 1.49          PTT - ( 28 Dec 2022 20:29 )  PTT:34.5 sec              RADIOLOGY, EKG & ADDITIONAL TESTS: Reviewed.

## 2022-12-29 NOTE — ED PROVIDER NOTE - PHYSICAL EXAMINATION
CONSTITUTIONAL: Awake, alert and in no apparent distress.  HEENT: Head is atraumatic. Eyes clear bilaterally, normal EOMI. Airway patent.  CARDIAC: Normal rate, regular rhythm.  Heart sounds S1, S2.   RESPIRATORY: Breath sounds clear and equal bilaterally. no tachypnea, respiratory distress.   GASTROINTESTINAL: Abdomen soft, non-tender, no guarding, distension.  MUSCULOSKELETAL: Spine appears normal, no midline spinal tenderness, range of motion is not limited, no muscle or joint tenderness. no bony tenderness.   NEUROLOGICAL: Alert, no focal deficits, no motor or sensory deficits. Strength bilateral upper extremity 5/5, bilateral lower extremities 5/5. no facial asymetry  SKIN: Skin normal color for race, warm, dry and intact. No evidence of rash.  PSYCHIATRIC: Normal mood and affect. no apparent risk to self or others. CONSTITUTIONAL: Awake, alert and in no apparent distress.  HEENT: Head is atraumatic. Eyes clear bilaterally, normal EOMI. Airway patent.  CARDIAC: Normal rate, regular rhythm.  Heart sounds S1, S2.   RESPIRATORY: Breath sounds clear and equal bilaterally. no tachypnea, respiratory distress.   GASTROINTESTINAL: Abdomen soft, non-tender, no guarding, distension.  MUSCULOSKELETAL: Spine appears normal, no midline spinal tenderness, range of motion is not limited, no muscle or joint tenderness. no bony tenderness.   NEUROLOGICAL: Alert, no focal deficits, no motor or sensory deficits. Strength bilateral upper extremity 5/5, bilateral lower extremities 5/5. no facial asymetry  SKIN: Skin normal color for race, warm, dry and intact. No evidence of rash.  PSYCHIATRIC: Normal mood and affect. no apparent risk to self or others.  rectal tone normal

## 2022-12-29 NOTE — OCCUPATIONAL THERAPY INITIAL EVALUATION ADULT - DIAGNOSIS, OT EVAL
Pt presents with L wrist drop (?chronic), decreased LUE strength, decreased functional endurance and decreased balance impacting his ability to independently complete ADLs, functional transfers, and functional mobility.

## 2022-12-29 NOTE — OCCUPATIONAL THERAPY INITIAL EVALUATION ADULT - RANGE OF MOTION EXAMINATION, UPPER EXTREMITY
LUE shoulder flexion grossly 0-80 degrees, LUE elbow flexion/extension grossly WFL, L wrist 1/5 (wrist drop noted), LUE digits decreased AROM/Right UE Active ROM was WNL (within normal limits)/Right UE Passive ROM was WNL (within normal limits)

## 2022-12-29 NOTE — OCCUPATIONAL THERAPY INITIAL EVALUATION ADULT - PHYSICAL ASSIST/NONPHYSICAL ASSIST: GROOMING, OT EVAL
perform hand hygiene standing at bathroom sink/verbal cues/nonverbal cues (demo/gestures)/1 person assist

## 2022-12-29 NOTE — H&P ADULT - NSHPSOCIALHISTORY_GEN_ALL_CORE
Denies tobacco, alcohol, other drug use. Lives with wife. Dependent of ADLs. HHA 4hrs 2 days/week, 5hrs 3 days/week M-F.

## 2022-12-29 NOTE — CONSULT NOTE ADULT - SUBJECTIVE AND OBJECTIVE BOX
Patient is a 82y old  Male who presents with a chief complaint of recrudescence of old stroke (29 Dec 2022 02:18)        HPI:  82M PMH asthma, DM, HTN, HLD, Parkinson's disease, CVA x 2 (2018, residual deficits of left with left hand and shoulder weakness, 2022, residual deficits of left wrist drop and weakness - currently takes ASA 81mg daily), presenting to [facility] ED on 12/28 with chief complaint of worsening left hand weakness x1 day. Pt was hospitalized in November for a new stroke with clinical findings of left wrist drop which has since then been improving. Of note, he had a prolonged hospital course 2/2 PNA. Since being discharged from the hospital, pt has required more help at home, needing assistance to ambulate and perform ADLs. Pt had cough with pneumonia in the hospital but that initially improved and pt's L hand was getting stronger. However, pt started to develop worsening cough productive of yellow sputum 2 weeks ago. Otherwise denies fever, chills, chest pain, sob, n/v/d/c, sore throat, rhinorrhea. On the day prior to admission, pt was extremely fatigued and slept most of the day. Pt was also found to have increasing weakness of L hand and therefore presented to ED. Pt denies numbness, tingling, or change in baseline mental status.    On review of patient's outside records, received tPA AT [facility] on 11/16 for left hand weakness.     ED Course:  VS: T 98, HR 78, /67, O2 97%  Labs s/f: WBC 10.7, Hb 11.2  Stroke code was called and pt found to have NIHSS 1 for left facial droop (wife and son reported it was chronic to neuro team but said it was new to medicine team)  CTA head and neck without steno-occlusive disease, but does demonstrate small layering right pleural effusion as well as nonspecific consolidative opacities superior right lower lobe, which could represent pneumonia.   NCHCT with chronic left MCA territory infarct with surrounding encephalomalacia and gliosis, unchanged as well as chronic right thalamic lacunar infarct. No acute findings.  (29 Dec 2022 02:18)      PAST MEDICAL & SURGICAL HISTORY:  Asthma      HTN (hypertension)      Diabetes      Stroke      Parkinson&#x27;s disease      No significant past surgical history          MEDICATIONS  (STANDING):  aspirin enteric coated 81 milliGRAM(s) Oral daily  atorvastatin 80 milliGRAM(s) Oral at bedtime  azithromycin   Tablet 500 milliGRAM(s) Oral every 24 hours  brimonidine 0.2% Ophthalmic Solution 1 Drop(s) Both EYES two times a day  carbidopa/levodopa CR 50/200 1 Tablet(s) Oral <User Schedule>  carbidopa/levodopa/entacapone 50/200/200 1 Tablet(s) Oral <User Schedule>  cefTRIAXone   IVPB 1000 milliGRAM(s) IV Intermittent every 24 hours  cholecalciferol 1000 Unit(s) Oral daily  dextrose 5%. 1000 milliLiter(s) (100 mL/Hr) IV Continuous <Continuous>  enoxaparin Injectable 40 milliGRAM(s) SubCutaneous every 24 hours  glucagon  Injectable 1 milliGRAM(s) IntraMuscular once  insulin lispro (ADMELOG) corrective regimen sliding scale   SubCutaneous Before meals and at bedtime  losartan 100 milliGRAM(s) Oral daily  tamsulosin 0.4 milliGRAM(s) Oral at bedtime    MEDICATIONS  (PRN):  albuterol    90 MICROgram(s) HFA Inhaler 2 Puff(s) Inhalation every 6 hours PRN Shortness of Breath and/or Wheezing  benzonatate 100 milliGRAM(s) Oral every 8 hours PRN Cough         FAMILY HISTORY:    CBC Full  -  ( 28 Dec 2022 20:29 )  WBC Count : 10.71 K/uL  RBC Count : 3.80 M/uL  Hemoglobin : 11.2 g/dL  Hematocrit : 33.3 %  Platelet Count - Automated : 449 K/uL  Mean Cell Volume : 87.6 fl  Mean Cell Hemoglobin : 29.5 pg  Mean Cell Hemoglobin Concentration : 33.6 gm/dL  Auto Neutrophil # : 7.81 K/uL  Auto Lymphocyte # : 1.45 K/uL  Auto Monocyte # : 1.11 K/uL  Auto Eosinophil # : 0.28 K/uL  Auto Basophil # : 0.03 K/uL  Auto Neutrophil % : 72.9 %  Auto Lymphocyte % : 13.5 %  Auto Monocyte % : 10.4 %  Auto Eosinophil % : 2.6 %  Auto Basophil % : 0.3 %      12-28    132<L>  |  94<L>  |  20  ----------------------------<  124<H>  4.2   |  29  |  0.90    Ca    9.3      28 Dec 2022 20:29    TPro  7.3  /  Alb  3.4  /  TBili  0.4  /  DBili  x   /  AST  24  /  ALT  6<L>  /  AlkPhos  104  12-28            Radiology :     < from: CT Head No Cont (12.28.22 @ 23:22) >  ACC: [id number] EXAM:  CT BRAIN                          PROCEDURE DATE:  12/28/2022          INTERPRETATION:  INDICATIONS: Left hand weakness.    TECHNIQUE: Serial axial images were obtained from the skull base to the   vertex without the use of intravenous contrast. Sagittal and coronal   images were reformatted.    COMPARISON EXAMINATION: MRI of the brain from 8/30/2022.    FINDINGS:  VENTRICLES AND SULCI: There is prominence of the cortical sulci   consistent with age-appropriate parenchymal volume loss. No hydrocephalus.  INTRA-AXIAL: No intracranial mass effect, acute hemorrhage or acute   transcortical infarct is seen. Chronic left MCA territory infarct with   surrounding encephalomalacia and gliosis, unchanged. Chronic right   thalamic lacunar infarct.  EXTRA-AXIAL: No fluid collection is present.  VISUALIZED SINUSES: No air-fluid levels are identified.  VISUALIZED MASTOIDS: Clear.  CALVARIUM: No acute calvarial fracture.  MISCELLANEOUS: Status post bilateral ocular lens replacement.    IMPRESSION:  No acute intracranial findings.      < from: CT Angio Head w/ IV Cont (12.28.22 @ 23:23) >  ACC: [id number] EXAM:  CT ANGIO BRAIN (W)AW IC                        ACC: [id number] EXAM:  CT ANGIO NECK (W)AW IC                          PROCEDURE DATE:  12/28/2022          INTERPRETATION:  I agree with the below report dictated by the   radiologist Dr. Ruvalcaba, with the above modifications made by myself.   (Scott Akhtar MD)    If clinicians have any questions/need for clarification regarding this   report, Dr. Scott Akhtar can be best reached via the patient secure   hospital email system      Initial report created on 12/28/2022 11:49:09 PM EST  PROCEDURE INFORMATION:  Exam: CTA Head With Contrast, Arteriography  Exam date and time: 12/28/2022 10:57 PM  Age: 82 years old  Clinical indication: Non-specific neurological symptoms . Possible stroke    TECHNIQUE:  Imaging protocol: Computed tomographic angiography of the head with   contrast.  Exam focused on the arteries.  Total images: 1432    COMPARISON:  CT ANGIO BRAIN WITHOUT AND OR WITH IV CONTRAST 12/28/2022 10:44 PM    FINDINGS:    ANTERIOR CIRCULATION:  Right internal carotid artery: Intracranial segment is patent with no  significant stenosis. No aneurysm.  Right middle cerebral artery: No occlusion or significant stenosis. No  aneurysm.  Right anterior cerebral artery: Noocclusion or significant stenosis. No  aneurysm.    Left internal carotid artery: Intracranial segment is patent with no  significant stenosis. No aneurysm.  Left middle cerebral artery: No occlusion or significant stenosis. No   aneurysm.    Left anterior cerebral artery: No occlusion or significant stenosis. No  aneurysm.    POSTERIOR CIRCULATION:  Right vertebral artery: No occlusion or significant stenosis. No aneurysm.  Left vertebral artery: No occlusion or significant stenosis. No aneurysm.  Basilar artery: No occlusion or significant stenosis. No aneurysm.  Right posterior cerebral artery: No occlusion or significant stenosis. No  aneurysm.  Left posterior cerebral artery: No occlusion or significant stenosis. No  aneurysm.    Brain: Chronic left parietal lobe encephalomalacia.  Cerebral ventricles: No ventriculomegaly.  Orbital cavities: Evidence of prior cataract surgery.  Bones/joints: Unremarkable. No acute fracture.  Soft tissues: Unremarkable.    IMPRESSION:  No intracraniallarge vessel occlusion.      =========================  PROCEDURE INFORMATION:  Exam: CTA Neck With Contrast  Exam date and time: 12/28/2022 10:57 PM  Age: 82 years old  Clinical indication: Non-specific neurological symptoms. Possible stroke    TECHNIQUE:  Imaging protocol: Computed tomographic angiography of the neck with   contrast.  3D rendering (Not supervised by radiologist): MIP and/or 3D reconstructed  images were created by the technologist.    COMPARISON:  CT ANGIO NECK WITHOUT AND OR WITH IV CONTRAST 8/24/2019 3:46 PM    FINDINGS:  Right common carotid artery: No stenosis. No dissection or occlusion.  Right internal carotid artery: No stenosis of the extracranial segment. No  dissection or occlusion.  Right external carotid artery:No occlusion or stenosis of the origin.    Left common carotid artery: No stenosis. No dissection or occlusion.  Left internal carotid artery: No stenosis of the extracranial segment. No  dissection or occlusion.  Left external carotid artery: No occlusion or stenosis of the origin.    Right vertebral artery: No stenosis. No dissection or occlusion.  Left vertebral artery: No stenosis. No dissection or occlusion.    Soft tissues: Normal. No significant soft tissue swelling.    Bones/joints: No acute fracture.    Lungs: Chronic areas of bilateral scarring in the lung apices. Nonspecific  consolidative opacities superior right lower lobe image 979 series 8.  Pleural spaces: Small layering right pleural effusion.    IMPRESSION:  1. No stenosis or occlusion.  2. Small layering right pleural effusion.  3. Nonspecific consolidative opacities superior right lower lobe image 979  series 8. This could represent pneumonia.                   Vital Signs Last 24 Hrs  T(C): 36.7 (29 Dec 2022 04:38), Max: 37.1 (29 Dec 2022 03:29)  T(F): 98.1 (29 Dec 2022 04:38), Max: 98.7 (29 Dec 2022 03:29)  HR: 80 (29 Dec 2022 04:38) (72 - 81)  BP: 140/87 (29 Dec 2022 04:38) (105/70 - 140/87)  BP(mean): --  RR: 18 (29 Dec 2022 04:38) (16 - 18)  SpO2: 96% (29 Dec 2022 04:38) (96% - 97%)    Parameters below as of 29 Dec 2022 03:29  Patient On (Oxygen Delivery Method): room air            REVIEW OF SYSTEMS:  per hpi         Physical Exam:  82 y o man lying comfortably in semi Samson's position , awake , alert , no new complaints     Neurologic Exam:    Alert and oriented to person , place , date/year, speech fluent w/o dysarthria , follows commands , recent and remote memory intact , repetition intact , comprehension intact ,  attention/concentration intact , fund of knowledge appropriate    Cranial Nerves:     II :                         pupils equal , round and reactive to light , visual fields intact   III/ IV/VI :              extraocular movements intact , neg nystagmus , neg ptosis  V :                        facial sensation intact , V1-3 normal  VII :                      L droop , normal eye closure    VIII :                     hearing intact to finger rub bilaterally  IX and X :             no hoarseness , gag intact , palate/ uvula rise symmetrically  XI :                       SCM / trapezius strength intact bilateral  XII :                      no tongue deviation    Motor Exam:          Right UE:               no focal weakness ,  > 4/5 throughout  , no drift                                Left UE:           L wrist drop , > 3/5 proximally         Right LE:                no focal weakness,  > 4/5 throughout      Left LE:                  no focal weakness,  > 4/5 throughout           Sensation:         intact to light touch x 4 extremities                         no neglect or extinction on double simultaneous testing                       DTR :                     biceps/brachioradialis : equal                                              patella/ankle : equal                                                                               neg Babinski      Gait :  not tested        PM&R Impression :     1) admitted for worsening of left hand weakness     2) no acute findings on CT brain imaging       Recommendations / Plan :     1) Physical / Occupational therapy focusing on therapeutic exercises , equipment evaluation , bed mobility/transfer out of bed evaluation , progressive ambulation with assistive devices prn .    2) Current disposition plan recommendation  :  pending functional progress

## 2022-12-29 NOTE — ED PROVIDER NOTE - CARE PLAN
1 Principal Discharge DX:	Weakness   Principal Discharge DX:	Weakness  Secondary Diagnosis:	Pneumonia

## 2022-12-29 NOTE — CONSULT NOTE ADULT - ASSESSMENT
82y Male with PMHx of asthma, DM, HTN, HLD, Parkinson's disease, CVA x 2 (2018, left with left hand and shoulder weakness, 2022, left wrist drop and weakness - currently takes ASA 81mg daily), presenting to [facility] ED on 12/28 with chief complaint of worsening left hand weakness. NIHSS 1, pending official report of [facility]. CTA head and neck without any steno-occlusive disease but does demonstrate small layering right pleural effusion as well as nonspecific consolidative opacities superior right lower lobe, which could represent pneumonia.     - recommend pulmonary work up while in ED - will f/u CXR results  - Stroke team will followup for above mentioned testing; possible recrudescence of old stroke in the setting of PNA infection and may warrant a medical admission.    Discussed with Attending Dr. Trae Bo 82y Male with PMHx of asthma, DM, HTN, HLD, Parkinson's disease, CVA x 2 (2018, left with left hand and shoulder weakness, 2022, left wrist drop and weakness - currently takes ASA 81mg daily), presenting to [facility] ED on 12/28 with chief complaint of worsening left hand weakness. NIHSS 1[facility] head and neck without any steno-occlusive disease but does demonstrate small layering right pleural effusion as well as nonspecific consolidative opacities superior right lower lobe, which could represent pneumonia.     - recommend pulmonary work up while in ED - will f/u CXR results  - Stroke team will follow-up for above mentioned testing; possible recrudescence of old stroke (left hand weakness present since stroke in November 2022) in the setting of ?PNA infection and may warrant a medical admission.    Discussed with Attending Dr. Trae Bo 82y Male with PMHx of asthma, DM, HTN, HLD, Parkinson's disease, CVA x 2 (2018, left with left hand and shoulder weakness, 2022, left wrist drop and weakness - currently takes ASA 81mg daily), presenting to [facility] ED on 12/28 with chief complaint of worsening left hand weakness. NIHSS 1[facility] head and neck without any steno-occlusive disease but does demonstrate small layering right pleural effusion as well as nonspecific consolidative opacities superior right lower lobe, which could represent pneumonia.     - recommend pulmonary work up while in ED - will f/u CXR results  - Stroke team will follow-up for above mentioned testing; possible recrudescence of old stroke (left hand weakness present since stroke in November 2022) in the setting of ?PNA infection and may warrant a medical admission. If admitted, can obtain MRI brain short stroke protocol.    Discussed with Attending Dr. Trae Bo 82y Male with PMHx of asthma, DM, HTN, HLD, Parkinson's disease, CVA x 2 (2018, left with left hand and shoulder weakness, 2022, left wrist drop and weakness - currently takes ASA 81mg daily), presenting to [facility] ED on 12/28 with chief complaint of worsening left hand weakness. [facility] head and neck without any steno-occlusive disease but does demonstrate small layering right pleural effusion as well as nonspecific consolidative opacities superior right lower lobe, which could represent pneumonia.     - recommend pulmonary work up while in ED - will f/u CXR results.   - Stroke team will follow-up for above mentioned testing; possible recrudescence of old stroke (left hand weakness present since stroke in November 2022) in the setting of ?PNA infection and may warrant a medical admission. If admitted, can obtain MRI brain short stroke protocol.    Discussed with Attending Dr. rTae Bo 82y Male with PMHx of asthma, DM, HTN, HLD, Parkinson's disease, CVA x 2 (2018, left with left hand and shoulder weakness, 2022, left wrist drop and weakness - currently takes ASA 81mg daily), presenting to [facility] ED on 12/28 with chief complaint of worsening left hand weakness. NIHSS 1, NCHCT without any acute findings. CTA head and neck without any steno-occlusive disease but does demonstrate small layering right pleural effusion as well as nonspecific consolidative opacities superior right lower lobe, which could represent pneumonia.     - recommend pulmonary work up while in ED - will f/u CXR results.   - Stroke team will follow-up for above mentioned testing; possible recrudescence of old stroke (left hand weakness present since stroke in November 2022) in the setting of ?PNA infection and may warrant a medical admission. If admitted, can obtain MRI brain short stroke protocol.    Discussed with Attending Dr. Trae Bo 82y Male with PMHx of asthma, DM, HTN, HLD, Parkinson's disease, CVA x 2 (2018, left with left hand and shoulder weakness, 2022, left wrist drop and weakness - currently takes ASA 81mg daily), presenting to [facility] ED on 12/28 with chief complaint of worsening left hand weakness. NIHSS 1, NCHCT without any acute findings. CTA head and neck without any steno-occlusive disease but does demonstrate small layering right pleural effusion as well as nonspecific consolidative opacities superior right lower lobe, which could represent pneumonia.     - recommend pulmonary work up and exam while in ED - will f/u CXR results.   - Stroke team will follow-up for above mentioned testing; possible recrudescence of old stroke (left hand weakness present since stroke in November 2022 and patient with hypertonic muscle tone which is more reflective of a chronic deficit) in the setting of ?PNA infection and may warrant a medical admission. If admitted, can obtain MRI brain short stroke protocol.    Discussed with Attending Dr. Trae Bo 82y Male with PMHx of asthma, DM, HTN, HLD, Parkinson's disease, CVA x 2 (2018, left with left hand and shoulder weakness, 2022, left wrist drop and weakness - currently takes ASA 81mg daily), presenting to [facility] ED on 12/28 with chief complaint of worsening left hand weakness. NIHSS 1, NCHCT without any acute findings. CTA head and neck without any steno-occlusive disease but does demonstrate small layering right pleural effusion as well as nonspecific consolidative opacities superior right lower lobe, which could represent pneumonia.     - recommend pulmonary work up and exam while in ED - will f/u CXR results.   - Stroke team will follow-up for above mentioned testing; possible recrudescence of old stroke (left hand weakness present since stroke in November 2022 and patient with hypertonic muscle tone which is more reflective of a chronic deficit) in the setting of ?PNA infection and may warrant a medical admission.   - If admitted, obtain MRI brain noncontrast short stroke protocol to rule out new infarct. Stroke team will continue to follow    Discussed with Attending Dr. Trae Bo 82y Male with PMHx of asthma, DM, HTN, HLD, Parkinson's disease, CVA x 2 (2018, left with left hand and shoulder weakness, 2022, left wrist drop and weakness - currently takes ASA 81mg daily), presenting to [facility] ED on 12/28 with chief complaint of worsening left hand weakness. NIHSS 1, NCHCT without any acute findings. CTA head and neck without any steno-occlusive disease but does demonstrate small layering right pleural effusion as well as nonspecific consolidative opacities superior right lower lobe, which could represent pneumonia.     - recommend pulmonary work up and exam while in ED - will f/u CXR results.   - Stroke team will follow-up for above mentioned testing; possible recrudescence of old stroke (left hand weakness present since stroke in November 2022 and patient with hypertonic muscle tone which is more reflective of a chronic deficit) in the setting of ?PNA infection.  - If admitted, obtain MRI brain noncontrast short stroke protocol to rule out new infarct. Stroke team will continue to follow    Discussed with Attending Dr. Trae Bo 82y Male with PMHx of asthma, DM, HTN, HLD, Parkinson's disease, CVA x 2 (2018, left with left hand and shoulder weakness, 2022, left wrist drop and weakness - currently takes ASA 81mg daily), presenting to [facility] ED on 12/28 with chief complaint of worsening left hand weakness. NIHSS 1, NCHCT without any acute findings. CTA head and neck without any steno-occlusive disease but does demonstrate small layering right pleural effusion as well as nonspecific consolidative opacities superior right lower lobe, which could represent pneumonia.     - recommend pulmonary work up and exam while in ED - will f/u CXR results.   - Stroke team will follow-up for above mentioned testing; ?possible recrudescence of old stroke (left hand weakness present since stroke in November 2022 in the setting of ?PNA infection.  - If admitted, obtain MRI brain noncontrast short stroke protocol to rule out new infarct. Stroke team will continue to follow. Continue ASA 81mg and 80mg of atorvastatin.     Discussed with Attending Dr. Trae Bo 82y Male with PMHx of asthma, DM, HTN, HLD, Parkinson's disease, CVA x 2 (2018, left with left hand and shoulder weakness, 2022, left wrist drop and weakness - currently takes ASA 81mg daily), presenting to [facility] ED on 12/28 with chief complaint of worsening left hand weakness. NIHSS 1, NCHCT without any acute findings. CTA head and neck without any steno-occlusive disease but does demonstrate small layering right pleural effusion as well as nonspecific consolidative opacities superior right lower lobe, which could represent pneumonia.     - recommend pulmonary work up and exam while in ED - will f/u CXR results.   - Stroke team will follow-up for above mentioned testing; ?possible recrudescence of old stroke (left hand weakness present since stroke in November 2022) in the setting of ?PNA infection  - If admitted, obtain MRI brain noncontrast short stroke protocol to rule out new infarct. Stroke team will continue to follow. Continue ASA 81mg and 80mg of atorvastatin.     Discussed with Attending Dr. Trae Bo

## 2022-12-29 NOTE — OCCUPATIONAL THERAPY INITIAL EVALUATION ADULT - GENERAL OBSERVATIONS, REHAB EVAL
OT IE Completed. MRS 4. Pt's RN Sanket cleared pt for therapy. Pt received semisupine in bed, +heplock, +bed alarm, room air, NAD, agreeable to OT. Pt tolerated session well. Pt left as found, +bed alarm, Stroke team OT IE Completed. MRS 4. Pt's RN Sanket cleared pt for therapy. Pt received semisupine in bed, +heplock, +bed alarm, room air, NAD, agreeable to OT. Pt tolerated session well. Pt left as found, +bed alarm, Stroke team present, TASHI Coles aware.

## 2022-12-29 NOTE — OCCUPATIONAL THERAPY INITIAL EVALUATION ADULT - SENSORY TESTS
CN Testing: V1-V3 sensation in tact; smile with no facial droop appreciated; tongue protrusion at midline w/ b/l lateralization in tact; b/l eyes open/close intact; b/l shoulder elevation intact

## 2022-12-29 NOTE — H&P ADULT - HISTORY OF PRESENT ILLNESS
82M PMH asthma, DM, HTN, HLD, Parkinson's disease, CVA x 2 (2018, residual deficits of left with left hand and shoulder weakness, 2022, residual deficits of left wrist drop and weakness - currently takes ASA 81mg daily), presenting to [facility] ED on 12/28 with chief complaint of worsening left hand weakness x1 day. Pt was hospitalized in November for a new stroke with clinical findings of left wrist drop which has since then been improving. Of note, he had a prolonged hospital course 2/2 PNA. Since being discharged from the hospital, pt has required more help at home, needing assistance to ambulate and perform ADLs. Pt had cough with pneumonia in the hospital but that initially improved and pt's L hand was getting stronger. However, pt started to develop worsening cough productive of yellow sputum 2 weeks ago. Otherwise denies fever, chills, chest pain, sob, n/v/d/c, sore throat, rhinorrhea. On the day prior to admission, pt was extremely fatigued and slept most of the day. Pt was also found to have increasing weakness of L hand and therefore presented to ED. Pt denies numbness, tingling, or change in baseline mental status.    On review of patient's outside records, received tPA AT [facility] on 11/16 for left hand weakness.     ED Course:  VS: T 98, HR 78, /67, O2 97%  Labs s/f: WBC 10.7, Hb 11.2  Stroke code was called and pt found to have NIHSS 1 for left facial droop (wife and son reported it was chronic to neuro team but said it was new to medicine team)  CTA head and neck without steno-occlusive disease, but does demonstrate small layering right pleural effusion as well as nonspecific consolidative opacities superior right lower lobe, which could represent pneumonia.   NCHCT with chronic left MCA territory infarct with surrounding encephalomalacia and gliosis, unchanged as well as chronic right thalamic lacunar infarct. No acute findings.

## 2022-12-29 NOTE — PROGRESS NOTE ADULT - ATTENDING COMMENTS
I personally examined Mr. Rubalcava and discussed management with the medical team. I agree with assessment except as below.    Patient reports feeling at baseline, denies SOB, no chest pain, no abdominal pain, no N/V. Noted with dry cough, he reports symptoms for a few weeks, reports intermittent dysuria, none recently. Aid at the bedside reports 4 BM today, dark brown no melena. No other complaints or events reported.    General: awake, alert, oriented, speaking slowly in full sentences, no labored breathing on RA  HEENT: AT/NC, left facial drop  Lungs: good air entry, no crackles, no wheezes  Heart: RRR  Abdomen: soft, non-tender, + BS  Extremities:  warm, left wrist drop, no LE edema, no calf tenderness, no focal deficit      Acute CVA vs CVA recrudescence   Diarrhea  Lung infiltrate   Parkinson's disease  Anemia normocytic    Patient with worsening left hand drop. Afebrile, no leucocytosis, Imaging with lung infiltrate, patient reports dry cough for the last 2 weeks, Procal negative. Follow-up MRI brain to r/o new CVA. Get UA   Get  C Diff, GIP, monitor electrolytes  Monitor Hb, no signs of bleeding I personally examined Mr. Rubalcava and discussed management with the medical team. I agree with assessment except as below.    Patient reports feeling at baseline, denies SOB, no chest pain, no abdominal pain, no N/V. Noted with dry cough, he reports symptoms for a few weeks, reports intermittent dysuria, none recently. Aid at the bedside reports 4 BM today, dark brown no melena. No other complaints or events reported.    General: awake, alert, oriented, speaking slowly in full sentences, no labored breathing on RA  HEENT: AT/NC, left facial drop  Lungs: good air entry, no crackles, no wheezes  Heart: RRR  Abdomen: soft, non-tender, + BS  Extremities:  warm, left wrist drop, no LE edema, no calf tenderness, no focal deficit      Acute CVA vs CVA recrudescence   Diarrhea  Lung infiltrate   Parkinson's disease  Anemia normocytic    Patient with worsening left hand drop. Afebrile, no leucocytosis, Imaging with lung infiltrate, patient reports dry cough for the last 2 weeks, Procal negative. Follow-up MRI brain to r/o new CVA. Get UA   Get  C Diff, GIP, monitor electrolytes  Monitor Hb, no signs of bleeding  PT evaluation

## 2022-12-29 NOTE — OCCUPATIONAL THERAPY INITIAL EVALUATION ADULT - MODIFIED CLINICAL TEST OF SENSORY INTEGRATION IN BALANCE TEST
Pt performed functional mobility to/from bathroom with R HHA progressing to no device with CGA. Decreased step length noted.

## 2022-12-29 NOTE — H&P ADULT - PROBLEM SELECTOR PLAN 4
Home med: losartan 100/HCTZ 12.5  Plan:  - hold HCTZ 12.5 in the setting of relative hypotension and hyponatremia  - continue home losartan 100mg

## 2022-12-29 NOTE — PHYSICAL THERAPY INITIAL EVALUATION ADULT - ADDITIONAL COMMENTS
Pt states he uses a cane at all times, lives with wife, no TEDDY. Has a home health aide 5hrs 4 days per week? (to be confirmed by SW)

## 2022-12-29 NOTE — PHYSICAL THERAPY INITIAL EVALUATION ADULT - PHYSICAL ASSIST/NONPHYSICAL ASSIST: SUPINE/SIT, REHAB EVAL
verbal cues/1 person assist
I have reviewed and confirmed nurses' notes for patient's medications, allergies, medical history, and surgical history.

## 2022-12-29 NOTE — OCCUPATIONAL THERAPY INITIAL EVALUATION ADULT - FINE MOTOR COORDINATION, RIGHT HAND, MANIPULATE OBJECTS, OT EVAL
Medical Decision Making difficulty managing, pulling, and ripping toilet paper while seated on toilet/moderate impairment

## 2022-12-29 NOTE — CONSULT NOTE ADULT - SUBJECTIVE AND OBJECTIVE BOX
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 ED on 12/28 with chief complaint of worsening left hand weakness. Upon interviewing patient's wife and son, they report that the patient was hospitalized in November for a new stroke with clinical findings of left wrist drop which has since then been improving. Of note, he had a prolonged hospital course 2/2 PNA. Patient's wife reports that on 12/27 patient was more weak than usual and since then has been gradually "losing flexibility in his left hand and it is returning back to how it was when he had his most recent stroke". At baseline since hospitalization in November, patient is able to ambulate with assistance x2, is intermittently incontinent, and has been having a persistent nightly cough. His wife reports that he has been more confused since he left the hospital in November.  On exam, NIHSS 1 for left facial droop (wife and son report this is chronic). NCHCT and CTA head and neck obtained. CTA head and neck without steno-occlusive disease, but does demonstrate small layering right pleural effusion as well as nonspecific consolidative opacities superior right lower lobe, which could represent pneumonia. Awaiting official NCHCT report by radiology.   On review of patient's outside records, received tPA AT [facility] on 11/16 for left hand weaknes. Unclear what further work up was performed.     T(C): 36.7 (12-28-22 @ 19:30), Max: 36.7 (12-28-22 @ 19:30)  HR: 78 (12-28-22 @ 19:30) (78 - 78)  BP: 108/67 (12-28-22 @ 19:30) (108/67 - 108/67)  RR: 18 (12-28-22 @ 19:30) (18 - 18)  SpO2: 97% (12-28-22 @ 19:30) (97% - 97%)    PAST MEDICAL & SURGICAL HISTORY:  Asthma      HTN (hypertension)      Diabetes      Stroke      Parkinson&#x27;s disease      No significant past surgical history          FAMILY HISTORY:      SOCIAL HISTORY:   Patient lives with wife    ROS:  Constitutional: No fever, weight loss or fatigue  Eyes: No eye pain, visual disturbances, or discharge  ENMT:  No difficulty hearing, tinnitus; No sinus or throat pain  Neck: No pain or stiffness  Respiratory: + dry cough  Cardiovascular: No chest pain, palpitations, shortness of breath, or leg swelling  Gastrointestinal: No abdominal pain. No nausea, vomiting or hematemesis; No diarrhea or constipation  Genitourinary: No dysuria, frequency, hematuria. + incontinence  Neurological: As per HPI  Skin: No itching, burning, rashes or lesions   Endocrine: No heat or cold intolerance; No hair loss  Musculoskeletal: No joint pain or swelling; No muscle, back or extremity pain  Heme/Lymph: No easy bruising or bleeding gums    MEDICATIONS  (STANDING):    MEDICATIONS  (PRN):    Allergies    No Known Allergies    Intolerances      Vital Signs Last 24 Hrs  T(C): 36.7 (28 Dec 2022 19:30), Max: 36.7 (28 Dec 2022 19:30)  T(F): 98 (28 Dec 2022 19:30), Max: 98 (28 Dec 2022 19:30)  HR: 78 (28 Dec 2022 19:30) (78 - 78)  BP: 108/67 (28 Dec 2022 19:30) (108/67 - 108/67)  BP(mean): --  RR: 18 (28 Dec 2022 19:30) (18 - 18)  SpO2: 97% (28 Dec 2022 19:30) (97% - 97%)    Parameters below as of 28 Dec 2022 19:30  Patient On (Oxygen Delivery Method): room air    Neurologic:  -Mental status: Awake, alert, oriented to person, place, and time. Speech is fluent with intact naming, repetition, and comprehension, no dysarthria. Follows commands. Attention/concentration intact. Fund of knowledge appropriate.  -Cranial nerves:   II: Visual fields are full to confrontation.  III, IV, VI: Extraocular movements are intact without nystagmus. Pupils equally round and reactive to light  V:  Facial sensation V1-V3 equal and intact   VII: Left facial droop  Motor: Decreased bulk throughout. Increased tone of left wrist with weak  strength. Strength bilateral upper extremity 5/5, bilateral lower extremities 5/5.  Sensation: Intact to light touch bilaterally. No neglect or extinction on double simultaneous testing.  Coordination: No dysmetria on finger-to-nose with right upper extremity    NIHSS: 1    Fingerstick Blood Glucose: CAPILLARY BLOOD GLUCOSE      POCT Blood Glucose.: 118 mg/dL (28 Dec 2022 19:29)    LABS:                        11.2   10.71 )-----------( 449      ( 28 Dec 2022 20:29 )             33.3     12-28    132<L>  |  94<L>  |  20  ----------------------------<  124<H>  4.2   |  29  |  0.90    Ca    9.3      28 Dec 2022 20:29    TPro  7.3  /  Alb  3.4  /  TBili  0.4  /  DBili  x   /  AST  24  /  ALT  6<L>  /  AlkPhos  104  12-28    PT/INR - ( 28 Dec 2022 20:29 )   PT: 17.8 sec;   INR: 1.49          PTT - ( 28 Dec 2022 20:29 )  PTT:34.5 sec      RADIOLOGY & ADDITIONAL STUDIES:  < from: CT Angio Neck w/ IV Cont (12.28.22 @ 23:25) >    IMPRESSION:  1. No stenosis or occlusion.  2. Small layering right pleural effusion.  3. Nonspecific consolidative opacities superior right lower lobe image 979  series 8. This could represent pneumonia.    < end of copied text >      -----------------------------------------------------------------------------------------------------------------  IV-tPA (Y/N):   N                         Reason IV-tPA not given: not within window    **STROKE CONSULT NOTE**    HPI: 82y Male with PMHx of asthma, DM, HTN, HLD, Parkinson's disease, CVA x 2 (2018, left with left hand and shoulder weakness, 2022, left wrist drop and weakness - currently takes ASA 81mg daily), presenting to [facility] ED on 12/28 with chief complaint of worsening left hand weakness. Upon interviewing patient's wife and son, they report that the patient was hospitalized in November for a new stroke with clinical findings of left wrist drop which has since then been improving. Of note, he had a prolonged hospital course 2/2 PNA. Patient's wife reports that on 12/27 patient was more weak than usual and since then has been gradually "losing flexibility in his left hand and it is returning back to how it was when he had his most recent stroke". At baseline since hospitalization in November, patient is able to ambulate with assistance x2, is intermittently incontinent, and has been having a persistent nightly cough. His wife reports that he has been more confused since he left the hospital in November.  On exam, NIHSS 1 for left facial droop (wife and son report this is chronic). NCHCT and CTA head and neck obtained. CTA head and neck without steno-occlusive disease, but does demonstrate small layering right pleural effusion as well as nonspecific consolidative opacities superior right lower lobe, which could represent pneumonia. Awaiting official NCHCT report by radiology.   On review of patient's outside records, received tPA AT [facility] on 11/16 for left hand weakness    T(C): 36.7 (12-28-22 @ 19:30), Max: 36.7 (12-28-22 @ 19:30)  HR: 78 (12-28-22 @ 19:30) (78 - 78)  BP: 108/67 (12-28-22 @ 19:30) (108/67 - 108/67)  RR: 18 (12-28-22 @ 19:30) (18 - 18)  SpO2: 97% (12-28-22 @ 19:30) (97% - 97%)    PAST MEDICAL & SURGICAL HISTORY:  Asthma      HTN (hypertension)      Diabetes      Stroke      Parkinson&#x27;s disease      No significant past surgical history          FAMILY HISTORY:      SOCIAL HISTORY:   Patient lives with wife    ROS:  Constitutional: No fever, weight loss or fatigue  Eyes: No eye pain, visual disturbances, or discharge  ENMT:  No difficulty hearing, tinnitus; No sinus or throat pain  Neck: No pain or stiffness  Respiratory: + dry cough  Cardiovascular: No chest pain, palpitations, shortness of breath, or leg swelling  Gastrointestinal: No abdominal pain. No nausea, vomiting or hematemesis; No diarrhea or constipation  Genitourinary: No dysuria, frequency, hematuria. + incontinence  Neurological: As per HPI  Skin: No itching, burning, rashes or lesions   Endocrine: No heat or cold intolerance; No hair loss  Musculoskeletal: No joint pain or swelling; No muscle, back or extremity pain  Heme/Lymph: No easy bruising or bleeding gums    MEDICATIONS  (STANDING):    MEDICATIONS  (PRN):    Allergies    No Known Allergies    Intolerances      Vital Signs Last 24 Hrs  T(C): 36.7 (28 Dec 2022 19:30), Max: 36.7 (28 Dec 2022 19:30)  T(F): 98 (28 Dec 2022 19:30), Max: 98 (28 Dec 2022 19:30)  HR: 78 (28 Dec 2022 19:30) (78 - 78)  BP: 108/67 (28 Dec 2022 19:30) (108/67 - 108/67)  BP(mean): --  RR: 18 (28 Dec 2022 19:30) (18 - 18)  SpO2: 97% (28 Dec 2022 19:30) (97% - 97%)    Parameters below as of 28 Dec 2022 19:30  Patient On (Oxygen Delivery Method): room air    Neurologic:  -Mental status: Awake, alert, oriented to person, place, and time. Speech is fluent with intact naming, repetition, and comprehension, no dysarthria. Follows commands. Attention/concentration intact. Fund of knowledge appropriate.  -Cranial nerves:   II: Visual fields are full to confrontation.  III, IV, VI: Extraocular movements are intact without nystagmus. Pupils equally round and reactive to light  V:  Facial sensation V1-V3 equal and intact   VII: Left facial droop  Motor: Decreased bulk throughout. Increased tone of left wrist with weak  strength. Strength bilateral upper extremity 5/5, bilateral lower extremities 5/5.  Sensation: Intact to light touch bilaterally. No neglect or extinction on double simultaneous testing.  Coordination: No dysmetria on finger-to-nose with right upper extremity    NIHSS: 1    Fingerstick Blood Glucose: CAPILLARY BLOOD GLUCOSE      POCT Blood Glucose.: 118 mg/dL (28 Dec 2022 19:29)    LABS:                        11.2   10.71 )-----------( 449      ( 28 Dec 2022 20:29 )             33.3     12-28    132<L>  |  94<L>  |  20  ----------------------------<  124<H>  4.2   |  29  |  0.90    Ca    9.3      28 Dec 2022 20:29    TPro  7.3  /  Alb  3.4  /  TBili  0.4  /  DBili  x   /  AST  24  /  ALT  6<L>  /  AlkPhos  104  12-28    PT/INR - ( 28 Dec 2022 20:29 )   PT: 17.8 sec;   INR: 1.49          PTT - ( 28 Dec 2022 20:29 )  PTT:34.5 sec      RADIOLOGY & ADDITIONAL STUDIES:  < from: CT Angio Neck w/ IV Cont (12.28.22 @ 23:25) >    IMPRESSION:  1. No stenosis or occlusion.  2. Small layering right pleural effusion.  3. Nonspecific consolidative opacities superior right lower lobe image 979  series 8. This could represent pneumonia.    < end of copied text >      -----------------------------------------------------------------------------------------------------------------  IV-tPA (Y/N):   N                         Reason IV-tPA not given: not within window    **STROKE CONSULT NOTE**    HPI: 82y Male with PMHx of asthma, DM, HTN, HLD, Parkinson's disease, CVA x 2 (2018, left with left hand and shoulder weakness, 2022, left wrist drop and weakness - currently takes ASA 81mg daily), presenting to [facility] ED on 12/28 with chief complaint of worsening left hand weakness. Upon interviewing patient's wife and son, they report that the patient was hospitalized in November for a new stroke with clinical findings of left wrist drop which has since then been improving. Of note, he had a prolonged hospital course 2/2 PNA. Patient's wife reports that on 12/27 patient was more weak than usual and since then has been gradually "losing flexibility in his left hand and it is returning back to how it was when he had his most recent stroke". At baseline since hospitalization in November, patient is able to ambulate with assistance x2, is intermittently incontinent, and has been having a persistent nightly cough. His wife reports that he has been more confused since he left the hospital in November.  On exam, NIHSS 1 for left facial droop (wife and son report this is chronic). NCHCT and CTA head and neck obtained. CTA head and neck without steno-occlusive disease, but does demonstrate small layering right pleural effusion as well as nonspecific consolidative opacities superior right lower lobe, which could represent pneumonia. NCHCT with chronic left MCA territory infarct with surrounding encephalomalacia and gliosis, unchanged as well as chronic right thalamic lacunar infarct. No acute findings.     On review of patient's outside records, received tPA AT [facility] on 11/16 for left hand weakness    T(C): 36.7 (12-28-22 @ 19:30), Max: 36.7 (12-28-22 @ 19:30)  HR: 78 (12-28-22 @ 19:30) (78 - 78)  BP: 108/67 (12-28-22 @ 19:30) (108/67 - 108/67)  RR: 18 (12-28-22 @ 19:30) (18 - 18)  SpO2: 97% (12-28-22 @ 19:30) (97% - 97%)    PAST MEDICAL & SURGICAL HISTORY:  Asthma      HTN (hypertension)      Diabetes      Stroke      Parkinson&#x27;s disease      No significant past surgical history          FAMILY HISTORY:      SOCIAL HISTORY:   Patient lives with wife    ROS:  Constitutional: No fever, weight loss or fatigue  Eyes: No eye pain, visual disturbances, or discharge  ENMT:  No difficulty hearing, tinnitus; No sinus or throat pain  Neck: No pain or stiffness  Respiratory: + dry cough  Cardiovascular: No chest pain, palpitations, shortness of breath, or leg swelling  Gastrointestinal: No abdominal pain. No nausea, vomiting or hematemesis; No diarrhea or constipation  Genitourinary: No dysuria, frequency, hematuria. + incontinence  Neurological: As per HPI  Skin: No itching, burning, rashes or lesions   Endocrine: No heat or cold intolerance; No hair loss  Musculoskeletal: No joint pain or swelling; No muscle, back or extremity pain  Heme/Lymph: No easy bruising or bleeding gums    MEDICATIONS  (STANDING):    MEDICATIONS  (PRN):    Allergies    No Known Allergies    Intolerances      Vital Signs Last 24 Hrs  T(C): 36.7 (28 Dec 2022 19:30), Max: 36.7 (28 Dec 2022 19:30)  T(F): 98 (28 Dec 2022 19:30), Max: 98 (28 Dec 2022 19:30)  HR: 78 (28 Dec 2022 19:30) (78 - 78)  BP: 108/67 (28 Dec 2022 19:30) (108/67 - 108/67)  BP(mean): --  RR: 18 (28 Dec 2022 19:30) (18 - 18)  SpO2: 97% (28 Dec 2022 19:30) (97% - 97%)    Parameters below as of 28 Dec 2022 19:30  Patient On (Oxygen Delivery Method): room air    Neurologic:  -Mental status: Awake, alert, oriented to person, place, and time. Speech is fluent with intact naming, repetition, and comprehension, no dysarthria. Follows commands. Attention/concentration intact. Fund of knowledge appropriate.  -Cranial nerves:   II: Visual fields are full to confrontation.  III, IV, VI: Extraocular movements are intact without nystagmus. Pupils equally round and reactive to light  V:  Facial sensation V1-V3 equal and intact   VII: Left facial droop  Motor: Decreased bulk throughout. Increased tone of left wrist with weak  strength. Strength bilateral upper extremity 5/5, bilateral lower extremities 5/5.  Sensation: Intact to light touch bilaterally. No neglect or extinction on double simultaneous testing.  Coordination: No dysmetria on finger-to-nose with right upper extremity    NIHSS: 1    Fingerstick Blood Glucose: CAPILLARY BLOOD GLUCOSE      POCT Blood Glucose.: 118 mg/dL (28 Dec 2022 19:29)    LABS:                        11.2   10.71 )-----------( 449      ( 28 Dec 2022 20:29 )             33.3     12-28    132<L>  |  94<L>  |  20  ----------------------------<  124<H>  4.2   |  29  |  0.90    Ca    9.3      28 Dec 2022 20:29    TPro  7.3  /  Alb  3.4  /  TBili  0.4  /  DBili  x   /  AST  24  /  ALT  6<L>  /  AlkPhos  104  12-28    PT/INR - ( 28 Dec 2022 20:29 )   PT: 17.8 sec;   INR: 1.49          PTT - ( 28 Dec 2022 20:29 )  PTT:34.5 sec      RADIOLOGY & ADDITIONAL STUDIES:  < from: CT Angio Neck w/ IV Cont (12.28.22 @ 23:25) >    IMPRESSION:  1. No stenosis or occlusion.  2. Small layering right pleural effusion.  3. Nonspecific consolidative opacities superior right lower lobe image 979  series 8. This could represent pneumonia.    < end of copied text >    < from: CT Head No Cont (12.28.22 @ 23:22) >  IMPRESSION:  No acute intracranial findings.    < end of copied text >      -----------------------------------------------------------------------------------------------------------------  IV-tPA (Y/N):   N                         Reason IV-tPA not given: not within window    **STROKE CONSULT NOTE**    HPI: 82y Male with PMHx of asthma, DM, HTN, HLD, Parkinson's disease, CVA x 2 (2018, residual deficits of left with left hand and shoulder weakness, 2022, residual deficits of left wrist drop and weakness - currently takes ASA 81mg daily), presenting to [facility] ED on 12/28 with chief complaint of worsening left hand weakness. Upon interviewing patient's wife and son, they report that the patient was hospitalized in November for a new stroke with clinical findings of left wrist drop which has since then been improving. Of note, he had a prolonged hospital course 2/2 PNA. Patient's wife reports that on 12/27 patient was more weak than usual and since then has been gradually "losing flexibility in his left hand and it is returning back to how it was when he had his most recent stroke". At baseline since hospitalization in November, patient is able to ambulate with assistance x2, is intermittently incontinent, and has been having a persistent nightly cough. His wife reports that he has been more confused since he left the hospital in November.  On exam, NIHSS 1 for left facial droop (wife and son report this is chronic). NCHCT and CTA head and neck obtained. CTA head and neck without steno-occlusive disease, but does demonstrate small layering right pleural effusion as well as nonspecific consolidative opacities superior right lower lobe, which could represent pneumonia. NCHCT with chronic left MCA territory infarct with surrounding encephalomalacia and gliosis, unchanged as well as chronic right thalamic lacunar infarct. No acute findings.     On review of patient's outside records, received tPA AT [facility] on 11/16 for left hand weakness    T(C): 36.7 (12-28-22 @ 19:30), Max: 36.7 (12-28-22 @ 19:30)  HR: 78 (12-28-22 @ 19:30) (78 - 78)  BP: 108/67 (12-28-22 @ 19:30) (108/67 - 108/67)  RR: 18 (12-28-22 @ 19:30) (18 - 18)  SpO2: 97% (12-28-22 @ 19:30) (97% - 97%)    PAST MEDICAL & SURGICAL HISTORY:  Asthma      HTN (hypertension)      Diabetes      Stroke      Parkinson&#x27;s disease      No significant past surgical history          FAMILY HISTORY:      SOCIAL HISTORY:   Patient lives with wife    ROS:  Constitutional: No fever, weight loss or fatigue  Eyes: No eye pain, visual disturbances, or discharge  ENMT:  No difficulty hearing, tinnitus; No sinus or throat pain  Neck: No pain or stiffness  Respiratory: + dry cough  Cardiovascular: No chest pain, palpitations, shortness of breath, or leg swelling  Gastrointestinal: No abdominal pain. No nausea, vomiting or hematemesis; No diarrhea or constipation  Genitourinary: No dysuria, frequency, hematuria. + incontinence  Neurological: As per HPI  Skin: No itching, burning, rashes or lesions   Endocrine: No heat or cold intolerance; No hair loss  Musculoskeletal: No joint pain or swelling; No muscle, back or extremity pain  Heme/Lymph: No easy bruising or bleeding gums    MEDICATIONS  (STANDING):    MEDICATIONS  (PRN):    Allergies    No Known Allergies    Intolerances      Vital Signs Last 24 Hrs  T(C): 36.7 (28 Dec 2022 19:30), Max: 36.7 (28 Dec 2022 19:30)  T(F): 98 (28 Dec 2022 19:30), Max: 98 (28 Dec 2022 19:30)  HR: 78 (28 Dec 2022 19:30) (78 - 78)  BP: 108/67 (28 Dec 2022 19:30) (108/67 - 108/67)  BP(mean): --  RR: 18 (28 Dec 2022 19:30) (18 - 18)  SpO2: 97% (28 Dec 2022 19:30) (97% - 97%)    Parameters below as of 28 Dec 2022 19:30  Patient On (Oxygen Delivery Method): room air    Neurologic:  -Mental status: Awake, alert, oriented to person, place, and time. Speech is fluent with intact naming, repetition, and comprehension, no dysarthria. Follows commands. Attention/concentration intact. Fund of knowledge appropriate.  -Cranial nerves:   II: Visual fields are full to confrontation.  III, IV, VI: Extraocular movements are intact without nystagmus. Pupils equally round and reactive to light  V:  Facial sensation V1-V3 equal and intact   VII: Left facial droop  Motor: Decreased bulk throughout. Increased tone of left wrist with weak  strength. Strength bilateral upper extremity 5/5, bilateral lower extremities 5/5.  Sensation: Intact to light touch bilaterally. No neglect or extinction on double simultaneous testing.  Coordination: No dysmetria on finger-to-nose with right upper extremity    NIHSS: 1    Fingerstick Blood Glucose: CAPILLARY BLOOD GLUCOSE      POCT Blood Glucose.: 118 mg/dL (28 Dec 2022 19:29)    LABS:                        11.2   10.71 )-----------( 449      ( 28 Dec 2022 20:29 )             33.3     12-28    132<L>  |  94<L>  |  20  ----------------------------<  124<H>  4.2   |  29  |  0.90    Ca    9.3      28 Dec 2022 20:29    TPro  7.3  /  Alb  3.4  /  TBili  0.4  /  DBili  x   /  AST  24  /  ALT  6<L>  /  AlkPhos  104  12-28    PT/INR - ( 28 Dec 2022 20:29 )   PT: 17.8 sec;   INR: 1.49          PTT - ( 28 Dec 2022 20:29 )  PTT:34.5 sec      RADIOLOGY & ADDITIONAL STUDIES:  < from: CT Angio Neck w/ IV Cont (12.28.22 @ 23:25) >    IMPRESSION:  1. No stenosis or occlusion.  2. Small layering right pleural effusion.  3. Nonspecific consolidative opacities superior right lower lobe image 979  series 8. This could represent pneumonia.    < end of copied text >    < from: CT Head No Cont (12.28.22 @ 23:22) >  IMPRESSION:  No acute intracranial findings.    < end of copied text >      -----------------------------------------------------------------------------------------------------------------  IV-tPA (Y/N):   N                         Reason IV-tPA not given: not within window    **STROKE CONSULT NOTE**    HPI: 82y Male with PMHx of asthma, DM, HTN, HLD, Parkinson's disease, CVA x 2 (2018, residual deficits of left with left hand and shoulder weakness, 2022, residual deficits of left wrist drop and weakness - currently takes ASA 81mg daily), presenting to [facility] ED on 12/28 with chief complaint of worsening left hand weakness. Upon interviewing patient's wife and son, they report that the patient was hospitalized in November for a new stroke with clinical findings of left wrist drop which has since then been improving. Of note, he had a prolonged hospital course 2/2 PNA. Patient's wife reports that on 12/27 patient had increased generalized weakness and since then has been gradually "losing flexibility in his left hand and it is returning back to how it was when he had his most recent stroke". At baseline since hospitalization in November, patient is able to ambulate with assistance x2, is intermittently incontinent, and has been having a persistent nightly cough. His wife reports that he has been more confused since he left the hospital in November.  On exam, NIHSS 1 for left facial droop (wife and son report this is chronic). NCHCT and CTA head and neck obtained. CTA head and neck without steno-occlusive disease, but does demonstrate small layering right pleural effusion as well as nonspecific consolidative opacities superior right lower lobe, which could represent pneumonia. NCHCT with chronic left MCA territory infarct with surrounding encephalomalacia and gliosis, unchanged as well as chronic right thalamic lacunar infarct. No acute findings.     On review of patient's outside records, received tPA AT [facility] on 11/16 for left hand weakness.     T(C): 36.7 (12-28-22 @ 19:30), Max: 36.7 (12-28-22 @ 19:30)  HR: 78 (12-28-22 @ 19:30) (78 - 78)  BP: 108/67 (12-28-22 @ 19:30) (108/67 - 108/67)  RR: 18 (12-28-22 @ 19:30) (18 - 18)  SpO2: 97% (12-28-22 @ 19:30) (97% - 97%)    PAST MEDICAL & SURGICAL HISTORY:  Asthma      HTN (hypertension)      Diabetes      Stroke      Parkinson&#x27;s disease      No significant past surgical history          FAMILY HISTORY:      SOCIAL HISTORY:   Patient lives with wife    ROS:  Constitutional: No fever, weight loss or fatigue  Eyes: No eye pain, visual disturbances, or discharge  ENMT:  No difficulty hearing, tinnitus; No sinus or throat pain  Neck: No pain or stiffness  Respiratory: + dry cough  Cardiovascular: No chest pain, palpitations, shortness of breath, or leg swelling  Gastrointestinal: No abdominal pain. No nausea, vomiting or hematemesis; No diarrhea or constipation  Genitourinary: No dysuria, frequency, hematuria. + incontinence  Neurological: As per HPI  Skin: No itching, burning, rashes or lesions   Endocrine: No heat or cold intolerance; No hair loss  Musculoskeletal: No joint pain or swelling; No muscle, back or extremity pain  Heme/Lymph: No easy bruising or bleeding gums    MEDICATIONS  (STANDING):    MEDICATIONS  (PRN):    Allergies    No Known Allergies    Intolerances      Vital Signs Last 24 Hrs  T(C): 36.7 (28 Dec 2022 19:30), Max: 36.7 (28 Dec 2022 19:30)  T(F): 98 (28 Dec 2022 19:30), Max: 98 (28 Dec 2022 19:30)  HR: 78 (28 Dec 2022 19:30) (78 - 78)  BP: 108/67 (28 Dec 2022 19:30) (108/67 - 108/67)  BP(mean): --  RR: 18 (28 Dec 2022 19:30) (18 - 18)  SpO2: 97% (28 Dec 2022 19:30) (97% - 97%)    Parameters below as of 28 Dec 2022 19:30  Patient On (Oxygen Delivery Method): room air    Neurologic:  -Mental status: Awake, alert, oriented to person, place, and time. Speech is fluent with intact naming, repetition, and comprehension, no dysarthria. Follows commands. Attention/concentration intact. Fund of knowledge appropriate.  -Cranial nerves:   II: Visual fields are full to confrontation.  III, IV, VI: Extraocular movements are intact without nystagmus. Pupils equally round and reactive to light  V:  Facial sensation V1-V3 equal and intact   VII: Left facial droop  Motor: Decreased bulk throughout. Increased tone of left wrist with weak  strength. Strength bilateral upper extremity 5/5, bilateral lower extremities 5/5.  Sensation: Intact to light touch bilaterally. No neglect or extinction on double simultaneous testing.  Coordination: No dysmetria on finger-to-nose with right upper extremity    NIHSS: 1    Fingerstick Blood Glucose: CAPILLARY BLOOD GLUCOSE      POCT Blood Glucose.: 118 mg/dL (28 Dec 2022 19:29)    LABS:                        11.2   10.71 )-----------( 449      ( 28 Dec 2022 20:29 )             33.3     12-28    132<L>  |  94<L>  |  20  ----------------------------<  124<H>  4.2   |  29  |  0.90    Ca    9.3      28 Dec 2022 20:29    TPro  7.3  /  Alb  3.4  /  TBili  0.4  /  DBili  x   /  AST  24  /  ALT  6<L>  /  AlkPhos  104  12-28    PT/INR - ( 28 Dec 2022 20:29 )   PT: 17.8 sec;   INR: 1.49          PTT - ( 28 Dec 2022 20:29 )  PTT:34.5 sec      RADIOLOGY & ADDITIONAL STUDIES:  < from: CT Angio Neck w/ IV Cont (12.28.22 @ 23:25) >    IMPRESSION:  1. No stenosis or occlusion.  2. Small layering right pleural effusion.  3. Nonspecific consolidative opacities superior right lower lobe image 979  series 8. This could represent pneumonia.    < end of copied text >    < from: CT Head No Cont (12.28.22 @ 23:22) >  IMPRESSION:  No acute intracranial findings.    < end of copied text >      -----------------------------------------------------------------------------------------------------------------  IV-tPA (Y/N):   N                         Reason IV-tPA not given: not within window    **STROKE CONSULT NOTE**    HPI: 82y Male with PMHx of asthma, DM, HTN, HLD, Parkinson's disease, CVA x 2 (2018, residual deficits of left hand and shoulder weakness, 2022, residual deficits of left wrist drop and weakness - currently takes ASA 81mg daily), presenting to [facility] ED on 12/28 with chief complaint of worsening left hand weakness. Upon interviewing patient's wife and son, they report that the patient was hospitalized in November for a new stroke with clinical findings of left wrist drop which has since then been improving. Of note, he had a prolonged hospital course 2/2 PNA during this admission in November. Patient's wife reports that on 12/27 patient had increased generalized weakness and since then has been gradually "losing flexibility in his left hand and it is returning back to how it was when he had his most recent stroke". At baseline since hospitalization in November, patient is able to ambulate with assistance x2, is intermittently incontinent, and has been having a persistent nightly cough. His wife reports that he has been more confused since he left the hospital in November.  On exam, NIHSS 1 for left facial droop (wife and son report this is chronic). NCHCT and CTA head and neck obtained. CTA head and neck without steno-occlusive disease, but does demonstrate small layering right pleural effusion as well as nonspecific consolidative opacities superior right lower lobe, which could represent pneumonia. NCHCT with chronic left MCA territory infarct with surrounding encephalomalacia and gliosis, unchanged as well as chronic right thalamic lacunar infarct. No acute findings.     On review of patient's outside records, patient received tPA AT [facility] on 11/16 for stroke symptoms and was left with left hand deficits.     T(C): 36.7 (12-28-22 @ 19:30), Max: 36.7 (12-28-22 @ 19:30)  HR: 78 (12-28-22 @ 19:30) (78 - 78)  BP: 108/67 (12-28-22 @ 19:30) (108/67 - 108/67)  RR: 18 (12-28-22 @ 19:30) (18 - 18)  SpO2: 97% (12-28-22 @ 19:30) (97% - 97%)    PAST MEDICAL & SURGICAL HISTORY:  Asthma      HTN (hypertension)      Diabetes      Stroke      Parkinson&#x27;s disease      No significant past surgical history          FAMILY HISTORY:      SOCIAL HISTORY:   Patient lives with wife    ROS:  Constitutional: No fever, weight loss or fatigue  Eyes: No eye pain, visual disturbances, or discharge  ENMT:  No difficulty hearing, tinnitus; No sinus or throat pain  Neck: No pain or stiffness  Respiratory: + dry cough  Cardiovascular: No chest pain, palpitations, shortness of breath, or leg swelling  Gastrointestinal: No abdominal pain. No nausea, vomiting or hematemesis; No diarrhea or constipation  Genitourinary: No dysuria, frequency, hematuria. + incontinence  Neurological: As per HPI  Skin: No itching, burning, rashes or lesions   Endocrine: No heat or cold intolerance; No hair loss  Musculoskeletal: No joint pain or swelling; No muscle, back or extremity pain  Heme/Lymph: No easy bruising or bleeding gums    MEDICATIONS  (STANDING):    MEDICATIONS  (PRN):    Allergies    No Known Allergies    Intolerances      Vital Signs Last 24 Hrs  T(C): 36.7 (28 Dec 2022 19:30), Max: 36.7 (28 Dec 2022 19:30)  T(F): 98 (28 Dec 2022 19:30), Max: 98 (28 Dec 2022 19:30)  HR: 78 (28 Dec 2022 19:30) (78 - 78)  BP: 108/67 (28 Dec 2022 19:30) (108/67 - 108/67)  BP(mean): --  RR: 18 (28 Dec 2022 19:30) (18 - 18)  SpO2: 97% (28 Dec 2022 19:30) (97% - 97%)    Parameters below as of 28 Dec 2022 19:30  Patient On (Oxygen Delivery Method): room air    Neurologic:  -Mental status: Awake, alert, oriented to person, place, and time. Speech is fluent with intact naming, repetition, and comprehension, no dysarthria. Follows commands. Attention/concentration intact. Fund of knowledge appropriate.  -Cranial nerves:   II: Visual fields are full to confrontation.  III, IV, VI: Extraocular movements are intact without nystagmus. Pupils equally round and reactive to light  V:  Facial sensation V1-V3 equal and intact   VII: Left facial droop  Motor: Decreased bulk throughout. Increased tone of left wrist with weak  strength. Strength bilateral upper extremity 5/5, bilateral lower extremities 5/5.  Sensation: Intact to light touch bilaterally. No neglect or extinction on double simultaneous testing.  Coordination: No dysmetria on finger-to-nose with right upper extremity    NIHSS: 1    Fingerstick Blood Glucose: CAPILLARY BLOOD GLUCOSE      POCT Blood Glucose.: 118 mg/dL (28 Dec 2022 19:29)    LABS:                        11.2   10.71 )-----------( 449      ( 28 Dec 2022 20:29 )             33.3     12-28    132<L>  |  94<L>  |  20  ----------------------------<  124<H>  4.2   |  29  |  0.90    Ca    9.3      28 Dec 2022 20:29    TPro  7.3  /  Alb  3.4  /  TBili  0.4  /  DBili  x   /  AST  24  /  ALT  6<L>  /  AlkPhos  104  12-28    PT/INR - ( 28 Dec 2022 20:29 )   PT: 17.8 sec;   INR: 1.49          PTT - ( 28 Dec 2022 20:29 )  PTT:34.5 sec      RADIOLOGY & ADDITIONAL STUDIES:  < from: CT Angio Neck w/ IV Cont (12.28.22 @ 23:25) >    IMPRESSION:  1. No stenosis or occlusion.  2. Small layering right pleural effusion.  3. Nonspecific consolidative opacities superior right lower lobe image 979  series 8. This could represent pneumonia.    < end of copied text >    < from: CT Head No Cont (12.28.22 @ 23:22) >  IMPRESSION:  No acute intracranial findings.    < end of copied text >      -----------------------------------------------------------------------------------------------------------------  IV-tPA (Y/N):   N                         Reason IV-tPA not given: not within window    **STROKE CONSULT NOTE**    HPI: 82y Male with PMHx of asthma, DM, HTN, HLD, Parkinson's disease, CVA x 2 (2018, residual deficits of left hand and shoulder weakness, 2022, residual deficits of left wrist drop and weakness - currently takes ASA 81mg daily), presenting to [facility] ED on 12/28 with chief complaint of worsening left hand weakness. Upon interviewing patient's wife and son, they report that the patient was hospitalized in November for a new stroke with clinical findings of left wrist drop which has since then been improving. Of note, he had a prolonged hospital course 2/2 PNA during this admission in November. Patient's wife reports that on 12/27 patient had increased generalized weakness and since then has been gradually "losing flexibility in his left hand and it is returning back to how it was when he had his most recent stroke". At baseline since hospitalization in November, patient is able to ambulate with assistance x2, is intermittently incontinent, and has been having a persistent nightly cough. His wife reports that he has been more confused since he left the hospital in November.  On exam, NIHSS 1 for left facial droop (wife and son report this is chronic). NCHCT and CTA head and neck obtained. CTA head and neck without steno-occlusive disease, but does demonstrate small layering right pleural effusion as well as nonspecific consolidative opacities superior right lower lobe, which could represent pneumonia. NCHCT with chronic left MCA territory infarct with surrounding encephalomalacia and gliosis, unchanged as well as chronic right thalamic lacunar infarct. No acute findings.     On review of patient's outside records, patient received tPA AT [facility] on 11/16 for stroke symptoms.     T(C): 36.7 (12-28-22 @ 19:30), Max: 36.7 (12-28-22 @ 19:30)  HR: 78 (12-28-22 @ 19:30) (78 - 78)  BP: 108/67 (12-28-22 @ 19:30) (108/67 - 108/67)  RR: 18 (12-28-22 @ 19:30) (18 - 18)  SpO2: 97% (12-28-22 @ 19:30) (97% - 97%)    PAST MEDICAL & SURGICAL HISTORY:  Asthma      HTN (hypertension)      Diabetes      Stroke      Parkinson&#x27;s disease      No significant past surgical history          FAMILY HISTORY:      SOCIAL HISTORY:   Patient lives with wife    ROS:  Constitutional: No fever, weight loss or fatigue  Eyes: No eye pain, visual disturbances, or discharge  ENMT:  No difficulty hearing, tinnitus; No sinus or throat pain  Neck: No pain or stiffness  Respiratory: + dry cough  Cardiovascular: No chest pain, palpitations, shortness of breath, or leg swelling  Gastrointestinal: No abdominal pain. No nausea, vomiting or hematemesis; No diarrhea or constipation  Genitourinary: No dysuria, frequency, hematuria. + incontinence  Neurological: As per HPI  Skin: No itching, burning, rashes or lesions   Endocrine: No heat or cold intolerance; No hair loss  Musculoskeletal: No joint pain or swelling; No muscle, back or extremity pain  Heme/Lymph: No easy bruising or bleeding gums    MEDICATIONS  (STANDING):    MEDICATIONS  (PRN):    Allergies    No Known Allergies    Intolerances      Vital Signs Last 24 Hrs  T(C): 36.7 (28 Dec 2022 19:30), Max: 36.7 (28 Dec 2022 19:30)  T(F): 98 (28 Dec 2022 19:30), Max: 98 (28 Dec 2022 19:30)  HR: 78 (28 Dec 2022 19:30) (78 - 78)  BP: 108/67 (28 Dec 2022 19:30) (108/67 - 108/67)  BP(mean): --  RR: 18 (28 Dec 2022 19:30) (18 - 18)  SpO2: 97% (28 Dec 2022 19:30) (97% - 97%)    Parameters below as of 28 Dec 2022 19:30  Patient On (Oxygen Delivery Method): room air    Neurologic:  -Mental status: Awake, alert, oriented to person, place, and time. Speech is fluent with intact naming, repetition, and comprehension, no dysarthria. Follows commands. Attention/concentration intact. Fund of knowledge appropriate.  -Cranial nerves:   II: Visual fields are full to confrontation.  III, IV, VI: Extraocular movements are intact without nystagmus. Pupils equally round and reactive to light  V:  Facial sensation V1-V3 equal and intact   VII: Left facial droop  Motor: Decreased bulk throughout. Increased tone of left wrist with weak  strength. Strength bilateral upper extremity 5/5, bilateral lower extremities 5/5.  Sensation: Intact to light touch bilaterally. No neglect or extinction on double simultaneous testing.  Coordination: No dysmetria on finger-to-nose with right upper extremity    NIHSS: 1    Fingerstick Blood Glucose: CAPILLARY BLOOD GLUCOSE      POCT Blood Glucose.: 118 mg/dL (28 Dec 2022 19:29)    LABS:                        11.2   10.71 )-----------( 449      ( 28 Dec 2022 20:29 )             33.3     12-28    132<L>  |  94<L>  |  20  ----------------------------<  124<H>  4.2   |  29  |  0.90    Ca    9.3      28 Dec 2022 20:29    TPro  7.3  /  Alb  3.4  /  TBili  0.4  /  DBili  x   /  AST  24  /  ALT  6<L>  /  AlkPhos  104  12-28    PT/INR - ( 28 Dec 2022 20:29 )   PT: 17.8 sec;   INR: 1.49          PTT - ( 28 Dec 2022 20:29 )  PTT:34.5 sec      RADIOLOGY & ADDITIONAL STUDIES:  < from: CT Angio Neck w/ IV Cont (12.28.22 @ 23:25) >    IMPRESSION:  1. No stenosis or occlusion.  2. Small layering right pleural effusion.  3. Nonspecific consolidative opacities superior right lower lobe image 979  series 8. This could represent pneumonia.    < end of copied text >    < from: CT Head No Cont (12.28.22 @ 23:22) >  IMPRESSION:  No acute intracranial findings.    < end of copied text >      -----------------------------------------------------------------------------------------------------------------  IV-tPA (Y/N):   N                         Reason IV-tPA not given: not within window

## 2022-12-29 NOTE — OCCUPATIONAL THERAPY INITIAL EVALUATION ADULT - ADDITIONAL COMMENTS
Pt lives with his spouse in an apartment with 0STE. Pt reports he was independent with ADLs, has a HHA for 4-5 hrs/day, 5day/wk. To assist with IADLs and ?ADLs (pt with inconsistent reports). Pt ambulates with SC. Pt reports his wife is with him at nighttime. Pt is R hand dominant, has residual L sided weakness from previous stroke.

## 2022-12-29 NOTE — OCCUPATIONAL THERAPY INITIAL EVALUATION ADULT - MD ORDER
82M PMH asthma, DM, HTN, HLD, Parkinson's disease, CVA x 2 (2018, residual deficits of left with left hand and shoulder weakness, 2022, residual deficits of left wrist drop and weakness - currently takes ASA 81mg daily), presenting to [facility] ED on 12/28 with chief complaint of worsening left hand weakness x1 day with concern for recrudescence of recent stroke in the setting of pneumonia.

## 2022-12-29 NOTE — PHYSICAL THERAPY INITIAL EVALUATION ADULT - MANUAL MUSCLE TESTING RESULTS, REHAB EVAL
LE's not tested, however, pt demo's at least 3+/5 due to ability to ambulate with CG A LE's not tested, however, pt demo's at least 3+/5 due to ability to ambulate with CG A. RUE grossly 5/5 throughout, LUE shoulder flexion/extension 3-/5, LUE elbow flexion 3+/5, L elbow extension 3-/5, L wrist extension 1/5, L  strength 1/5.

## 2022-12-29 NOTE — H&P ADULT - PROBLEM SELECTOR PLAN 2
Pt recently admitted to hospital in 11/2022 for stroke with course c/b pneumonia s/p treatment. Pt's cough initially improved after leaving the hospital, but 2 weeks ago he started to develop worsening cough productive of yellow sputum. Otherwise denies fever, chills, chest pain, sob, n/v/d/c, sore throat, rhinorrhea. On the day prior to admission, pt was extremely fatigued and slept most of the day.  - CTA head and neck showing small layering right pleural effusion as well as nonspecific consolidative opacities superior right lower lobe, which could represent pneumonia  Plan:  - unclear if CT findings are residual from recent pneumonia or acute. however, given the fact that pt is having worsening of his stroke symptoms will treat for HAP  - will start ceftriaxone and azithromycin for now as pt not septic, can broaden if not clinically improving  - f/u urine strep and legionella  - f/u procal

## 2022-12-29 NOTE — OCCUPATIONAL THERAPY INITIAL EVALUATION ADULT - MANUAL MUSCLE TESTING RESULTS, REHAB EVAL
RUE grossly 5/5 throughout, LUE shoulder flexion/extension 3-/5, LUE elbow flexion 3+/5, L elbow extension 3-/5, L wrist extension 1/5, L  strength 1/5. BLE grossly greater than or equal to 3+/5 based on functional assessment against gravity.

## 2022-12-30 ENCOUNTER — TRANSCRIPTION ENCOUNTER (OUTPATIENT)
Age: 82
End: 2022-12-30

## 2022-12-30 VITALS
OXYGEN SATURATION: 94 % | SYSTOLIC BLOOD PRESSURE: 109 MMHG | HEART RATE: 75 BPM | TEMPERATURE: 98 F | DIASTOLIC BLOOD PRESSURE: 68 MMHG | RESPIRATION RATE: 18 BRPM

## 2022-12-30 DIAGNOSIS — J18.9 PNEUMONIA, UNSPECIFIED ORGANISM: ICD-10-CM

## 2022-12-30 DIAGNOSIS — R53.1 WEAKNESS: ICD-10-CM

## 2022-12-30 LAB
ALBUMIN SERPL ELPH-MCNC: 3 G/DL — LOW (ref 3.3–5)
ALP SERPL-CCNC: 102 U/L — SIGNIFICANT CHANGE UP (ref 40–120)
ALT FLD-CCNC: <5 U/L — LOW (ref 10–45)
ANION GAP SERPL CALC-SCNC: 10 MMOL/L — SIGNIFICANT CHANGE UP (ref 5–17)
AST SERPL-CCNC: 31 U/L — SIGNIFICANT CHANGE UP (ref 10–40)
BASOPHILS # BLD AUTO: 0.05 K/UL — SIGNIFICANT CHANGE UP (ref 0–0.2)
BASOPHILS NFR BLD AUTO: 0.6 % — SIGNIFICANT CHANGE UP (ref 0–2)
BILIRUB SERPL-MCNC: 0.3 MG/DL — SIGNIFICANT CHANGE UP (ref 0.2–1.2)
BUN SERPL-MCNC: 8 MG/DL — SIGNIFICANT CHANGE UP (ref 7–23)
CALCIUM SERPL-MCNC: 9.3 MG/DL — SIGNIFICANT CHANGE UP (ref 8.4–10.5)
CHLORIDE SERPL-SCNC: 94 MMOL/L — LOW (ref 96–108)
CO2 SERPL-SCNC: 27 MMOL/L — SIGNIFICANT CHANGE UP (ref 22–31)
CREAT SERPL-MCNC: 0.66 MG/DL — SIGNIFICANT CHANGE UP (ref 0.5–1.3)
EGFR: 95 ML/MIN/1.73M2 — SIGNIFICANT CHANGE UP
EOSINOPHIL # BLD AUTO: 0.3 K/UL — SIGNIFICANT CHANGE UP (ref 0–0.5)
EOSINOPHIL NFR BLD AUTO: 3.7 % — SIGNIFICANT CHANGE UP (ref 0–6)
GLUCOSE BLDC GLUCOMTR-MCNC: 207 MG/DL — HIGH (ref 70–99)
GLUCOSE SERPL-MCNC: 111 MG/DL — HIGH (ref 70–99)
HCT VFR BLD CALC: 35.6 % — LOW (ref 39–50)
HGB BLD-MCNC: 11.7 G/DL — LOW (ref 13–17)
IMM GRANULOCYTES NFR BLD AUTO: 0.2 % — SIGNIFICANT CHANGE UP (ref 0–0.9)
LYMPHOCYTES # BLD AUTO: 1.35 K/UL — SIGNIFICANT CHANGE UP (ref 1–3.3)
LYMPHOCYTES # BLD AUTO: 16.5 % — SIGNIFICANT CHANGE UP (ref 13–44)
MAGNESIUM SERPL-MCNC: 1.7 MG/DL — SIGNIFICANT CHANGE UP (ref 1.6–2.6)
MCHC RBC-ENTMCNC: 28.6 PG — SIGNIFICANT CHANGE UP (ref 27–34)
MCHC RBC-ENTMCNC: 32.9 GM/DL — SIGNIFICANT CHANGE UP (ref 32–36)
MCV RBC AUTO: 87 FL — SIGNIFICANT CHANGE UP (ref 80–100)
MONOCYTES # BLD AUTO: 0.87 K/UL — SIGNIFICANT CHANGE UP (ref 0–0.9)
MONOCYTES NFR BLD AUTO: 10.6 % — SIGNIFICANT CHANGE UP (ref 2–14)
NEUTROPHILS # BLD AUTO: 5.61 K/UL — SIGNIFICANT CHANGE UP (ref 1.8–7.4)
NEUTROPHILS NFR BLD AUTO: 68.4 % — SIGNIFICANT CHANGE UP (ref 43–77)
NRBC # BLD: 0 /100 WBCS — SIGNIFICANT CHANGE UP (ref 0–0)
PHOSPHATE SERPL-MCNC: 3 MG/DL — SIGNIFICANT CHANGE UP (ref 2.5–4.5)
PLATELET # BLD AUTO: 447 K/UL — HIGH (ref 150–400)
POTASSIUM SERPL-MCNC: 3.6 MMOL/L — SIGNIFICANT CHANGE UP (ref 3.5–5.3)
POTASSIUM SERPL-SCNC: 3.6 MMOL/L — SIGNIFICANT CHANGE UP (ref 3.5–5.3)
PROT SERPL-MCNC: 6.9 G/DL — SIGNIFICANT CHANGE UP (ref 6–8.3)
RBC # BLD: 4.09 M/UL — LOW (ref 4.2–5.8)
RBC # FLD: 12.5 % — SIGNIFICANT CHANGE UP (ref 10.3–14.5)
SODIUM SERPL-SCNC: 131 MMOL/L — LOW (ref 135–145)
WBC # BLD: 8.2 K/UL — SIGNIFICANT CHANGE UP (ref 3.8–10.5)
WBC # FLD AUTO: 8.2 K/UL — SIGNIFICANT CHANGE UP (ref 3.8–10.5)

## 2022-12-30 PROCEDURE — 71045 X-RAY EXAM CHEST 1 VIEW: CPT

## 2022-12-30 PROCEDURE — 80053 COMPREHEN METABOLIC PANEL: CPT

## 2022-12-30 PROCEDURE — 87389 HIV-1 AG W/HIV-1&-2 AB AG IA: CPT

## 2022-12-30 PROCEDURE — 70551 MRI BRAIN STEM W/O DYE: CPT

## 2022-12-30 PROCEDURE — 87324 CLOSTRIDIUM AG IA: CPT

## 2022-12-30 PROCEDURE — 0225U NFCT DS DNA&RNA 21 SARSCOV2: CPT

## 2022-12-30 PROCEDURE — 83540 ASSAY OF IRON: CPT

## 2022-12-30 PROCEDURE — 70496 CT ANGIOGRAPHY HEAD: CPT | Mod: MA

## 2022-12-30 PROCEDURE — 84100 ASSAY OF PHOSPHORUS: CPT

## 2022-12-30 PROCEDURE — 97116 GAIT TRAINING THERAPY: CPT

## 2022-12-30 PROCEDURE — 87635 SARS-COV-2 COVID-19 AMP PRB: CPT

## 2022-12-30 PROCEDURE — 85610 PROTHROMBIN TIME: CPT

## 2022-12-30 PROCEDURE — 70498 CT ANGIOGRAPHY NECK: CPT | Mod: MA

## 2022-12-30 PROCEDURE — 36415 COLL VENOUS BLD VENIPUNCTURE: CPT

## 2022-12-30 PROCEDURE — 84145 PROCALCITONIN (PCT): CPT

## 2022-12-30 PROCEDURE — 82728 ASSAY OF FERRITIN: CPT

## 2022-12-30 PROCEDURE — 82962 GLUCOSE BLOOD TEST: CPT

## 2022-12-30 PROCEDURE — 99239 HOSP IP/OBS DSCHRG MGMT >30: CPT

## 2022-12-30 PROCEDURE — 81003 URINALYSIS AUTO W/O SCOPE: CPT

## 2022-12-30 PROCEDURE — 80048 BASIC METABOLIC PNL TOTAL CA: CPT

## 2022-12-30 PROCEDURE — 87899 AGENT NOS ASSAY W/OPTIC: CPT

## 2022-12-30 PROCEDURE — 87449 NOS EACH ORGANISM AG IA: CPT

## 2022-12-30 PROCEDURE — 83735 ASSAY OF MAGNESIUM: CPT

## 2022-12-30 PROCEDURE — 70450 CT HEAD/BRAIN W/O DYE: CPT | Mod: MA

## 2022-12-30 PROCEDURE — 97161 PT EVAL LOW COMPLEX 20 MIN: CPT

## 2022-12-30 PROCEDURE — 83036 HEMOGLOBIN GLYCOSYLATED A1C: CPT

## 2022-12-30 PROCEDURE — 85730 THROMBOPLASTIN TIME PARTIAL: CPT

## 2022-12-30 PROCEDURE — 85025 COMPLETE CBC W/AUTO DIFF WBC: CPT

## 2022-12-30 PROCEDURE — 87507 IADNA-DNA/RNA PROBE TQ 12-25: CPT

## 2022-12-30 PROCEDURE — 84466 ASSAY OF TRANSFERRIN: CPT

## 2022-12-30 PROCEDURE — 99285 EMERGENCY DEPT VISIT HI MDM: CPT

## 2022-12-30 RX ORDER — LOSARTAN POTASSIUM 100 MG/1
1 TABLET, FILM COATED ORAL
Qty: 0 | Refills: 0 | DISCHARGE
Start: 2022-12-30

## 2022-12-30 RX ORDER — LOSARTAN/HYDROCHLOROTHIAZIDE 100MG-25MG
1 TABLET ORAL
Qty: 0 | Refills: 0 | DISCHARGE

## 2022-12-30 RX ORDER — LOSARTAN POTASSIUM 100 MG/1
1 TABLET, FILM COATED ORAL
Qty: 30 | Refills: 0
Start: 2022-12-30 | End: 2023-01-28

## 2022-12-30 RX ORDER — SODIUM CHLORIDE 9 MG/ML
500 INJECTION, SOLUTION INTRAVENOUS
Refills: 0 | Status: DISCONTINUED | OUTPATIENT
Start: 2022-12-30 | End: 2022-12-30

## 2022-12-30 RX ORDER — SODIUM CHLORIDE 9 MG/ML
500 INJECTION INTRAMUSCULAR; INTRAVENOUS; SUBCUTANEOUS ONCE
Refills: 0 | Status: COMPLETED | OUTPATIENT
Start: 2022-12-30 | End: 2022-12-30

## 2022-12-30 RX ADMIN — CARBIDOPA, LEVODOPA, AND ENTACAPONE 1 TABLET(S): 50; 200; 200 TABLET, FILM COATED ORAL at 07:59

## 2022-12-30 RX ADMIN — ENOXAPARIN SODIUM 40 MILLIGRAM(S): 100 INJECTION SUBCUTANEOUS at 06:43

## 2022-12-30 RX ADMIN — SODIUM CHLORIDE 1000 MILLILITER(S): 9 INJECTION INTRAMUSCULAR; INTRAVENOUS; SUBCUTANEOUS at 13:07

## 2022-12-30 RX ADMIN — CARBIDOPA, LEVODOPA, AND ENTACAPONE 1 TABLET(S): 50; 200; 200 TABLET, FILM COATED ORAL at 14:02

## 2022-12-30 RX ADMIN — Medication 1000 UNIT(S): at 12:46

## 2022-12-30 RX ADMIN — LOSARTAN POTASSIUM 100 MILLIGRAM(S): 100 TABLET, FILM COATED ORAL at 06:43

## 2022-12-30 RX ADMIN — Medication 4: at 12:45

## 2022-12-30 RX ADMIN — BRIMONIDINE TARTRATE 1 DROP(S): 2 SOLUTION/ DROPS OPHTHALMIC at 06:44

## 2022-12-30 RX ADMIN — CARBIDOPA AND LEVODOPA 1 TABLET(S): 25; 100 TABLET ORAL at 06:43

## 2022-12-30 RX ADMIN — Medication 81 MILLIGRAM(S): at 12:47

## 2022-12-30 NOTE — PROGRESS NOTE ADULT - PROBLEM SELECTOR PLAN 1
Pt was hospitalized in November for a new stroke with clinical findings of left wrist drop. Per chart review, pt received tPA at [facility] on 11/16. Pt was in hospital for extended time due to pneumonia and when d/c'ed had poor functional status and dependent of ADLs. L hand strength and flexibility was improving but on 12/28, pt had more weakness in L hand than normal. Denies numbness, tingling, or change in baseline mental status  - Stroke code called in ED and pt found to have NIHSS 1 for left facial droop (wife and son reported it was chronic to neuro team but said it was new to medicine team)   - CTA head and neck without steno-occlusive disease, but does demonstrate small layering right pleural effusion as well as nonspecific consolidative opacities superior right lower lobe, which could represent pneumonia.   - NCHCT with chronic left MCA territory infarct with surrounding encephalomalacia and gliosis, unchanged as well as chronic right thalamic lacunar infarct. No acute findings.  Plan:  - per stroke, likely recrudescence of recent stroke in the setting of possible pneumonia. however, given the fact that is unclear if L facial droop is new, will obtain MR brain  - f/u MR brain  - f/u stroke recs  - continue lipitor 80mg, asa 81  - treat pna as below  - will also give 500cc NS in the setting of mild hyponatremia that may indicate hypovolemia  - PT/OT Pt was hospitalized in November for a new stroke with clinical findings of left wrist drop. Per chart review, pt received tPA at [facility] on 11/16. Pt was in hospital for extended time due to pneumonia and when d/c'ed had poor functional status and dependent of ADLs. L hand strength and flexibility was improving but on 12/28, pt had more weakness in L hand than normal. Denies numbness, tingling, or change in baseline mental status  - Stroke code called in ED and pt found to have NIHSS 1 for left facial droop (wife and son reported it was chronic to neuro team but said it was new to medicine team)   - CTA head and neck without steno-occlusive disease, but does demonstrate small layering right pleural effusion as well as nonspecific consolidative opacities superior right lower lobe, which could represent pneumonia.   - NCHCT with chronic left MCA territory infarct with surrounding encephalomalacia and gliosis, unchanged as well as chronic right thalamic lacunar infarct. No acute findings.  Plan:  - per stroke, likely recrudescence of recent stroke in the setting of possible pneumonia. however, given the fact that is unclear if L facial droop is new but MRI showing no acute changes.     Plan:  - f/u stroke recs  - continue lipitor 80mg, asa 81  - PT/OT Pt was hospitalized in November for a new stroke with clinical findings of left wrist drop. Per chart review, pt received tPA at [facility] on 11/16. Pt was in hospital for extended time due to pneumonia and when d/c'ed had poor functional status and dependent of ADLs. L hand strength and flexibility was improving but on 12/28, pt had more weakness in L hand than normal. Denies numbness, tingling, or change in baseline mental status  - Stroke code called in ED and pt found to have NIHSS 1 for left facial droop (wife and son reported it was chronic to neuro team but said it was new to medicine team)   - CTA head and neck without steno-occlusive disease, but does demonstrate small layering right pleural effusion as well as nonspecific consolidative opacities superior right lower lobe, which could represent pneumonia.   - NCHCT with chronic left MCA territory infarct with surrounding encephalomalacia and gliosis, unchanged as well as chronic right thalamic lacunar infarct. No acute findings.  Plan:  - per stroke, likely recrudescence of recent stroke in the setting of possible pneumonia. however, given the fact that is unclear if L facial droop is new but MRI showing no acute changes.     Plan:  - Discharge to [facility]  - f/u stroke recs  - continue lipitor 80mg, asa 81  - PT/OT

## 2022-12-30 NOTE — DISCHARGE NOTE NURSING/CASE MANAGEMENT/SOCIAL WORK - NSDCPEFALRISK_GEN_ALL_CORE
For information on Fall & Injury Prevention, visit: [email or web address] OR  [email or web address] OR  https://www.cdc.gov/steadi/patient.html

## 2022-12-30 NOTE — PROGRESS NOTE ADULT - SUBJECTIVE AND OBJECTIVE BOX
OVERNIGHT EVENTS:    SUBJECTIVE / INTERVAL HPI: Patient seen and examined at bedside.       PHYSICAL EXAM:    General: WDWN  HEENT: NC/AT; PERRL, anicteric sclera; MMM  Neck: supple  Cardiovascular: +S1/S2, RRR  Respiratory: CTA B/L; no W/R/R  Gastrointestinal: soft, NT/ND; +BSx4  Extremities: WWP; no edema, clubbing or cyanosis  Vascular: 2+ radial, DP/PT pulses B/L  Neurological: AAOx3; no focal deficits  Psychiatric: pleasant mood and affect  Dermatologic: no appreciable wounds or damage to the skin    VITAL SIGNS:  Vital Signs Last 24 Hrs  T(C): 36.7 (30 Dec 2022 04:11), Max: 37.2 (29 Dec 2022 23:17)  T(F): 98.1 (30 Dec 2022 04:11), Max: 98.9 (29 Dec 2022 23:17)  HR: 75 (30 Dec 2022 04:11) (63 - 75)  BP: 109/68 (30 Dec 2022 04:11) (103/60 - 120/74)  BP(mean): --  RR: 18 (30 Dec 2022 04:11) (18 - 20)  SpO2: 94% (30 Dec 2022 04:11) (94% - 98%)    Parameters below as of 29 Dec 2022 23:17  Patient On (Oxygen Delivery Method): room air          MEDICATIONS:  MEDICATIONS  (STANDING):  aspirin enteric coated 81 milliGRAM(s) Oral daily  atorvastatin 80 milliGRAM(s) Oral at bedtime  brimonidine 0.2% Ophthalmic Solution 1 Drop(s) Both EYES two times a day  carbidopa/levodopa CR 50/200 1 Tablet(s) Oral <User Schedule>  carbidopa/levodopa/entacapone 50/200/200 1 Tablet(s) Oral <User Schedule>  cholecalciferol 1000 Unit(s) Oral daily  dextrose 5%. 1000 milliLiter(s) (100 mL/Hr) IV Continuous <Continuous>  enoxaparin Injectable 40 milliGRAM(s) SubCutaneous every 24 hours  glucagon  Injectable 1 milliGRAM(s) IntraMuscular once  insulin lispro (ADMELOG) corrective regimen sliding scale   SubCutaneous Before meals and at bedtime  losartan 100 milliGRAM(s) Oral daily  tamsulosin 0.4 milliGRAM(s) Oral at bedtime    MEDICATIONS  (PRN):  albuterol    90 MICROgram(s) HFA Inhaler 2 Puff(s) Inhalation every 6 hours PRN Shortness of Breath and/or Wheezing  benzonatate 100 milliGRAM(s) Oral every 8 hours PRN Cough      ALLERGIES:  Allergies    No Known Allergies    Intolerances        LABS:                        11.7   8.20  )-----------( 447      ( 30 Dec 2022 07:00 )             35.6         131<L>  |  94<L>  |  13  ----------------------------<  130<H>  3.6   |  26  |  0.62    Ca    9.5      29 Dec 2022 05:30  Phos  2.7       Mg     1.5         TPro  7.3  /  Alb  3.4  /  TBili  0.4  /  DBili  x   /  AST  24  /  ALT  6<L>  /  AlkPhos  104      PT/INR - ( 28 Dec 2022 20:29 )   PT: 17.8 sec;   INR: 1.49          PTT - ( 28 Dec 2022 20:29 )  PTT:34.5 sec  Urinalysis Basic - ( 29 Dec 2022 19:30 )    Color: Yellow / Appearance: Clear / S.010 / pH: x  Gluc: x / Ketone: Trace mg/dL  / Bili: Negative / Urobili: 0.2 E.U./dL   Blood: x / Protein: NEGATIVE mg/dL / Nitrite: NEGATIVE   Leuk Esterase: NEGATIVE / RBC: x / WBC x   Sq Epi: x / Non Sq Epi: x / Bacteria: x      CAPILLARY BLOOD GLUCOSE      POCT Blood Glucose.: 129 mg/dL (29 Dec 2022 21:52)      RADIOLOGY & ADDITIONAL TESTS: Reviewed. SUBJECTIVE / INTERVAL HPI: Patient seen and examined at bedside. Feels that wrist movement has been improving since yesterday. Experienced diarrhea on multiple occasions yesterday after antibiotics were started with concern for pneumonia but did not have any episodes once they were discontinued. States that cough has improved. Denies all other ROS.      PHYSICAL EXAM:    General: NAD, resting in bed comfortably   HEENT: PERRL, anicteric sclera; MMM  Neck: supple  Cardiovascular: +S1/S2, RRR  Respiratory: CTA B/L; no W/R/R  Gastrointestinal: soft, NT/ND; +BSx4  Extremities: WWP; no edema, clubbing or cyanosis.   Vascular: 2+ radial, PT pulses B/L  Neurological: AAOx3; Poor left wrist extension, needs assistance with right hand to push it up.  strength remains the same as yesterday at left hand, 4/5. Facial droop on left side has improved.   Psychiatric: pleasant mood and affect  Dermatologic: no appreciable wounds or damage to the skin    VITAL SIGNS:  Vital Signs Last 24 Hrs  T(C): 36.7 (30 Dec 2022 04:11), Max: 37.2 (29 Dec 2022 23:17)  T(F): 98.1 (30 Dec 2022 04:11), Max: 98.9 (29 Dec 2022 23:17)  HR: 75 (30 Dec 2022 04:11) (63 - 75)  BP: 109/68 (30 Dec 2022 04:11) (103/60 - 120/74)  BP(mean): --  RR: 18 (30 Dec 2022 04:11) (18 - 20)  SpO2: 94% (30 Dec 2022 04:11) (94% - 98%)    Parameters below as of 29 Dec 2022 23:17  Patient On (Oxygen Delivery Method): room air      MEDICATIONS:  MEDICATIONS  (STANDING):  aspirin enteric coated 81 milliGRAM(s) Oral daily  atorvastatin 80 milliGRAM(s) Oral at bedtime  brimonidine 0.2% Ophthalmic Solution 1 Drop(s) Both EYES two times a day  carbidopa/levodopa CR 50/200 1 Tablet(s) Oral <User Schedule>  carbidopa/levodopa/entacapone 50/200/200 1 Tablet(s) Oral <User Schedule>  cholecalciferol 1000 Unit(s) Oral daily  dextrose 5%. 1000 milliLiter(s) (100 mL/Hr) IV Continuous <Continuous>  enoxaparin Injectable 40 milliGRAM(s) SubCutaneous every 24 hours  glucagon  Injectable 1 milliGRAM(s) IntraMuscular once  insulin lispro (ADMELOG) corrective regimen sliding scale   SubCutaneous Before meals and at bedtime  losartan 100 milliGRAM(s) Oral daily  tamsulosin 0.4 milliGRAM(s) Oral at bedtime    MEDICATIONS  (PRN):  albuterol    90 MICROgram(s) HFA Inhaler 2 Puff(s) Inhalation every 6 hours PRN Shortness of Breath and/or Wheezing  benzonatate 100 milliGRAM(s) Oral every 8 hours PRN Cough      ALLERGIES:  Allergies    No Known Allergies    Intolerances        LABS:                        11.7   8.20  )-----------( 447      ( 30 Dec 2022 07:00 )             35.6         131<L>  |  94<L>  |  13  ----------------------------<  130<H>  3.6   |  26  |  0.62    Ca    9.5      29 Dec 2022 05:30  Phos  2.7       Mg     1.5         TPro  7.3  /  Alb  3.4  /  TBili  0.4  /  DBili  x   /  AST  24  /  ALT  6<L>  /  AlkPhos  104  12-    PT/INR - ( 28 Dec 2022 20:29 )   PT: 17.8 sec;   INR: 1.49          PTT - ( 28 Dec 2022 20:29 )  PTT:34.5 sec  Urinalysis Basic - ( 29 Dec 2022 19:30 )    Color: Yellow / Appearance: Clear / S.010 / pH: x  Gluc: x / Ketone: Trace mg/dL  / Bili: Negative / Urobili: 0.2 E.U./dL   Blood: x / Protein: NEGATIVE mg/dL / Nitrite: NEGATIVE   Leuk Esterase: NEGATIVE / RBC: x / WBC x   Sq Epi: x / Non Sq Epi: x / Bacteria: x      CAPILLARY BLOOD GLUCOSE      POCT Blood Glucose.: 129 mg/dL (29 Dec 2022 21:52)      RADIOLOGY & ADDITIONAL TESTS: Reviewed.

## 2022-12-30 NOTE — DISCHARGE NOTE PROVIDER - DATE OF DISCHARGE SERVICE:
Estimated Date of Delivery: 7/3/20    Hx   Kinjal twins  Trans/Vx  Normal anatomy x 2  Dating criteria confusing as multiple US at various places, but [facility] recommends using the  St done on 20 at 15 and 2 weeks. Making [facility] 20. Monthky growth scans.
30-Dec-2022

## 2022-12-30 NOTE — PROGRESS NOTE ADULT - PROBLEM SELECTOR PLAN 5
Home med: metformin 500mg BID  Plan:  - hold metformin while i/p  - ISS  - f/u a1c Home med: metformin 500mg BID    Plan:  - hold metformin while i/p  - ISS  - f/u a1c

## 2022-12-30 NOTE — PROGRESS NOTE ADULT - PROBLEM SELECTOR PLAN 4
Home med: losartan 100/HCTZ 12.5  Plan:  - hold HCTZ 12.5 in the setting of relative hypotension and hyponatremia  - continue home losartan 100mg Home med: losartan 100/HCTZ 12.5    Plan:  - hold HCTZ 12.5 in the setting of relative hypotension and hyponatremia  - continue home losartan 100mg

## 2022-12-30 NOTE — PROGRESS NOTE ADULT - ATTENDING COMMENTS
I personally examined Mr Rubalcava and discussed management with medical team. I agree with assessment except as below.    Patient reports feeling improved. Denies new weakness or numbness, chronic cough improving. Diarrhea resolved. We discussed his labs and MRI findings. Denies other complaints.    Patient with improved symptoms, off ATB.   UA negative, Procalcitonin negative, no leucocytosis. Viral panel negative, GI panel, C-diff negative   MRI without new CVA  Continue on secondary stroke preventions, Sinemet  PT recommending CALLIE

## 2022-12-30 NOTE — PROGRESS NOTE ADULT - PROBLEM SELECTOR PLAN 2
Pt recently admitted to hospital in 11/2022 for stroke with course c/b pneumonia s/p treatment. Pt's cough initially improved after leaving the hospital, but 2 weeks ago he started to develop worsening cough productive of yellow sputum. Otherwise denies fever, chills, chest pain, sob, n/v/d/c, sore throat, rhinorrhea. On the day prior to admission, pt was extremely fatigued and slept most of the day.  - CTA head and neck showing small layering right pleural effusion as well as nonspecific consolidative opacities superior right lower lobe, which could represent pneumonia  Plan:  - unclear if CT findings are residual from recent pneumonia or acute. however, given the fact that pt is having worsening of his stroke symptoms will treat for HAP  - will start ceftriaxone and azithromycin for now as pt not septic, can broaden if not clinically improving  - f/u urine strep and legionella  - f/u procal Pt recently admitted to hospital in 11/2022 for stroke with course c/b pneumonia s/p treatment. Pt's cough initially improved after leaving the hospital, but 2 weeks ago he started to develop worsening cough productive of yellow sputum. Otherwise denies fever, chills, chest pain, sob, n/v/d/c, sore throat, rhinorrhea. On the day prior to admission, pt was extremely fatigued and slept most of the day. CTA head and neck showing small layering right pleural effusion as well as nonspecific consolidative opacities superior right lower lobe, which could represent pneumonia. Treated for HAP with ceftriaxone and azithromycin but discontinued after aptient develoepd diarrhea and pneumonia workup was not concerning with negative urine legionella and normal procal.     Plan:  - Continue to monitor

## 2022-12-30 NOTE — DISCHARGE NOTE PROVIDER - HOSPITAL COURSE
#Discharge: do not delete    82M [facility] asthma, DM, HTN, HLD, Parkinson's disease, CVA x 2 (2018, residual deficits of left with left hand and shoulder weakness, 2022, residual deficits of left wrist drop and weakness - currently takes ASA 81mg daily), presenting to [facility] ED on 12/28 with chief complaint of worsening left hand weakness x1 day with concern for recrudescence of recent stroke in the setting of pneumonia. Imaging showed no acute infarct with patient subjective improvement in symptoms with discharge to [facility].    #Left-sided weakness.   Pt was hospitalized in November for a new stroke with clinical findings of left wrist drop. Per chart review, pt received tPA at [facility] on 11/16. Pt was in hospital for extended time due to pneumonia and when d/c'ed had poor functional status and dependent of ADLs. L hand strength and flexibility was improving but on 12/28, pt had more weakness in L hand than normal. Denies numbness, tingling, or change in baseline mental status. Stroke code called in ED and pt found to have NIHSS 1 for left facial droop (wife and son reported it was chronic to neuro team but said it was new to medicine team). CTA head and neck without steno-occlusive disease, but does demonstrate small layering right pleural effusion as well as nonspecific consolidative opacities superior right lower lobe, which could represent pneumonia. NCHCT with chronic left MCA territory infarct with surrounding encephalomalacia and gliosis, unchanged as well as chronic right thalamic lacunar infarct. No acute findings. MRI with no acute findings. Patient lipitor 80 mg and asa 81.  - continue Lipitor 80mg, asa 81  - Continue with PT    #Hospital-acquired bacterial pneumonia.   Pt recently admitted to hospital in 11/2022 for stroke with course c/b pneumonia s/p treatment. Pt's cough initially improved after leaving the hospital, but 2 weeks ago he started to develop worsening cough productive of yellow sputum. Otherwise denied fever, chills, chest pain, sob, n/v/d/c, sore throat, rhinorrhea. On the day prior to admission, pt was extremely fatigued and slept most of the day. CTA head and neck showing small layering right pleural effusion as well as nonspecific consolidative opacities superior right lower lobe, which was concerning for pneumonia. He was initially treated with ceftriaxone and azithromycin but with normal procal, urine strep, legionella, negative RVP, and lack of systemic symptoms pneumonia was of low concern. Patient also developed diarrhea once antibiotics were initiated. As a result, antibiotics were stopped.     #Diarrhea  Patient developed diarrhea once antibiotics were initiated for pneumonia concern. GI PCR and C diff were collected with negative results. Antibiotics were stopped and diarrheal symptoms were relieved as a result.       #HTN (hypertension).   Was on losartan 100/HCTZ 12.5 at home. Held HCTZ 12.5 in the setting of relative hypotension and hyponatremia. Was on losartan 100 mg qd. Orthostatic positive during stay.  Plan:  - Continue holding HCTZ  - taper down Losartan to 50 mg qd.    #Diabetes  Home medication is metformin 500mg BID which was held during stay and provided moderate insulin sliding scale.   -Continue with metformin    #Parkinson's disease.   Continued home sinimet  in AM, carbidopa-ENTA-levodopa -200 TID at 8am, 3pm, 10pm.   - Continue with home medications    #Asthma  Continued home albuterol prn.  - Continue with home albuterol        Patient was discharged to: [facility]    New medications: None    Changes to old medications: Stopped HCTZ, decreased Losartan to 50 mg qd    Medications that were stopped: HCTZ    Items to follow up as outpatient: None    Physical exam at the time of discharge:  General: NAD, resting in bed comfortably   HEENT: PERRL, anicteric sclera; MMM  Neck: supple  Cardiovascular: +S1/S2, RRR  Respiratory: CTA B/L; no W/R/R  Gastrointestinal: soft, NT/ND; +BSx4  Extremities: WWP; no edema, clubbing or cyanosis.   Vascular: 2+ radial, PT pulses B/L  Neurological: AAOx3; Poor left wrist extension, needs assistance with right hand to push it up.  strength remains the same as yesterday at left hand, 4/5. Facial droop on left side has improved.   Psychiatric: pleasant mood and affect  Dermatologic: no appreciable wounds or damage to the skin

## 2022-12-30 NOTE — DISCHARGE NOTE NURSING/CASE MANAGEMENT/SOCIAL WORK - PATIENT PORTAL LINK FT
You can access the FollowMyHealth Patient Portal offered by [facility] by registering at the following website: [email or web address]. By joining [facility] FollowMyHealth portal, you will also be able to view your health information using other applications (apps) compatible with our system.

## 2022-12-30 NOTE — PATIENT PROFILE ADULT - FALL HARM RISK - RISK INTERVENTIONS

## 2022-12-30 NOTE — CHART NOTE - NSCHARTNOTEFT_GEN_A_CORE
MRI reviewed this morning - no acute infarct.   Likely recrudescence of prior stroke symptoms (given possible HAP)  Pt should continue aspirin 81 mg for secondary stroke prevention.  Pt appears to follow with [facility] (Dr. Waldron/NP Elda Stover) for Parkinson's - pt can continue to follow with them as an outpatient    Please reconsult stroke team at [phone] with any further questions.

## 2022-12-30 NOTE — DISCHARGE NOTE PROVIDER - NSDCCPCAREPLAN_GEN_ALL_CORE_FT
PRINCIPAL DISCHARGE DIAGNOSIS  Diagnosis: Left-sided weakness  Assessment and Plan of Treatment: You presented with worsening weakness of recent left sided weakness and a full workup was completed to see if there was a new stroke. Imaging revealed no acute findings and your symptoms appeared to slightly improve. You will be going to [facility] where you will be continuing to receive physical therapy to help build up your strenght. Continue taking your lipitor 80 mg once at night and aspirin 81 mg every day.      SECONDARY DISCHARGE DIAGNOSES  Diagnosis: HTN (hypertension)  Assessment and Plan of Treatment: Your blood pressures waned from normal to low. You take losartan 100 mg once daily and HCTZ 12.5 mg once daily at home. We discontinud your HCTZ 12.5 mg and lowered your losartan to 50 mg once daily in the setting of normal and low blood presures while in the hospital. We suggest that you only take Losartan 50 mg once daily once you leave the hospital.     PRINCIPAL DISCHARGE DIAGNOSIS  Diagnosis: Left-sided weakness  Assessment and Plan of Treatment: You presented with worsening weakness of recent left sided weakness and a full workup was completed to see if there was a new stroke. Imaging revealed no acute findings and your symptoms appeared to slightly improve. You will be going to [facility] where you will be continuing to receive physical therapy to help build up your strenght. Continue taking your lipitor 80 mg once at night and aspirin 81 mg every day.      SECONDARY DISCHARGE DIAGNOSES  Diagnosis: HTN (hypertension)  Assessment and Plan of Treatment: Your blood pressures waned from normal to low. You take losartan 100 mg once daily and HCTZ 12.5 mg once daily at home. We discontinud your HCTZ 12.5 mg and lowered your losartan to 50 mg once daily in the setting of normal and low blood presures while in the hospital. We suggest that you only take Losartan 50 mg once daily once you leave the hospital.    Diagnosis: Pneumonia  Assessment and Plan of Treatment: Secondary to imaging findings and your persistent cough, we were concerned for pneumonia. We initially treated you with antibiotics but stopped them as our workup provided us with evidence that made pneumonia of low concern. Your pulmonary symptoms appeared to improve during your hospital course.    Diagnosis: Diarrhea  Assessment and Plan of Treatment: You had a couple episodes of diarrhea during your stay once the antibiotics were started. We completed a full infectious workup which proved to be negative indicating that there was no infectious cause to your diarrhea. We stopped your antibiotics and your diarrhea resolved.

## 2022-12-30 NOTE — DISCHARGE NOTE PROVIDER - NSDCFUSCHEDAPPT_GEN_ALL_CORE_FT
Elda Stover  [facility]  NEUROLOGY [address]  Scheduled Appointment: 01/27/2023    Michael Waldron  [facility]  Neuro [address]  Scheduled Appointment: 03/03/2023

## 2022-12-30 NOTE — DISCHARGE NOTE PROVIDER - NSDCMRMEDTOKEN_GEN_ALL_CORE_FT
albuterol 90 mcg/inh inhalation aerosol: 2 puff(s) inhaled every 4 hours, As Needed  aspirin 81 mg oral tablet: 1 tab(s) orally once a day  brimonidine 0.2% ophthalmic solution: 1 drop(s) to each affected eye 2 times a day  carbidopa-levodopa 50 mg-200 mg oral tablet, extended release: 1 tab(s) orally once a day  carbidopa/levodopa/entacapone 50 mg-200 mg-200 mg oral tablet: 1 tab(s) orally 3 times a day  Colace 100 mg oral capsule: 1 cap(s) orally once a day  Lipitor 80 mg oral tablet: 1 tab(s) orally once a day  losartan 50 mg oral tablet: 1 tab(s) orally once a day  metFORMIN 500 mg oral tablet: 500 tab(s) orally 2 times a day   tamsulosin 0.4 mg oral capsule: 1 cap(s) orally once a day  Vitamin D3 25 mcg (1000 intl units) oral tablet: 1 tab(s) orally once a day

## 2022-12-30 NOTE — PROGRESS NOTE ADULT - ASSESSMENT
82M [facility] asthma, DM, HTN, HLD, Parkinson's disease, CVA x 2 (2018, residual deficits of left with left hand and shoulder weakness, 2022, residual deficits of left wrist drop and weakness - currently takes ASA 81mg daily), presenting to [facility] ED on 12/28 with chief complaint of worsening left hand weakness x1 day with concern for recrudescence of recent stroke in the setting of pneumonia. 82M [facility] asthma, DM, HTN, HLD, Parkinson's disease, CVA x 2 (2018, residual deficits of left with left hand and shoulder weakness, 2022, residual deficits of left wrist drop and weakness - currently takes ASA 81mg daily), presenting to [facility] ED on 12/28 with chief complaint of worsening left hand weakness x1 day with concern for recrudescence of recent stroke in the setting of pneumonia.

## 2022-12-30 NOTE — DISCHARGE NOTE PROVIDER - ATTENDING DISCHARGE PHYSICAL EXAMINATION:
I personally examined Mr Rubalcava and discussed management with medical team.   Please refer to note from same day.

## 2023-01-06 DIAGNOSIS — Z79.84 LONG TERM (CURRENT) USE OF ORAL HYPOGLYCEMIC DRUGS: ICD-10-CM

## 2023-01-06 DIAGNOSIS — D64.9 ANEMIA, UNSPECIFIED: ICD-10-CM

## 2023-01-06 DIAGNOSIS — R19.7 DIARRHEA, UNSPECIFIED: ICD-10-CM

## 2023-01-06 DIAGNOSIS — E78.5 HYPERLIPIDEMIA, UNSPECIFIED: ICD-10-CM

## 2023-01-06 DIAGNOSIS — I69.334 MONOPLEGIA OF UPPER LIMB FOLLOWING CEREBRAL INFARCTION AFFECTING LEFT NON-DOMINANT SIDE: ICD-10-CM

## 2023-01-06 DIAGNOSIS — R29.810 FACIAL WEAKNESS: ICD-10-CM

## 2023-01-06 DIAGNOSIS — G93.89 OTHER SPECIFIED DISORDERS OF BRAIN: ICD-10-CM

## 2023-01-06 DIAGNOSIS — J45.909 UNSPECIFIED ASTHMA, UNCOMPLICATED: ICD-10-CM

## 2023-01-06 DIAGNOSIS — I10 ESSENTIAL (PRIMARY) HYPERTENSION: ICD-10-CM

## 2023-01-06 DIAGNOSIS — E87.1 HYPO-OSMOLALITY AND HYPONATREMIA: ICD-10-CM

## 2023-01-06 DIAGNOSIS — G20 PARKINSON'S DISEASE: ICD-10-CM

## 2023-01-06 DIAGNOSIS — Z79.82 LONG TERM (CURRENT) USE OF ASPIRIN: ICD-10-CM

## 2023-01-06 DIAGNOSIS — R29.701 NIHSS SCORE 1: ICD-10-CM

## 2023-01-06 DIAGNOSIS — J18.9 PNEUMONIA, UNSPECIFIED ORGANISM: ICD-10-CM

## 2023-01-06 DIAGNOSIS — E11.9 TYPE 2 DIABETES MELLITUS WITHOUT COMPLICATIONS: ICD-10-CM

## 2023-01-23 ENCOUNTER — RX RENEWAL (OUTPATIENT)
Age: 83
End: 2023-01-23

## 2023-01-27 ENCOUNTER — APPOINTMENT (OUTPATIENT)
Dept: NEUROLOGY | Facility: CLINIC | Age: 83
End: 2023-01-27
Payer: MEDICARE

## 2023-01-27 VITALS — DIASTOLIC BLOOD PRESSURE: 70 MMHG | SYSTOLIC BLOOD PRESSURE: 105 MMHG | HEART RATE: 83 BPM

## 2023-01-27 VITALS
HEART RATE: 83 BPM | SYSTOLIC BLOOD PRESSURE: 117 MMHG | TEMPERATURE: 97.4 F | BODY MASS INDEX: 17.64 KG/M2 | DIASTOLIC BLOOD PRESSURE: 74 MMHG | OXYGEN SATURATION: 100 % | HEIGHT: 71 IN | WEIGHT: 126 LBS

## 2023-01-27 PROCEDURE — 99215 OFFICE O/P EST HI 40 MIN: CPT

## 2023-01-27 RX ORDER — MELOXICAM 15 MG/1
15 TABLET ORAL
Qty: 15 | Refills: 1 | Status: DISCONTINUED | COMMUNITY
Start: 2021-04-27 | End: 2023-01-27

## 2023-01-27 RX ORDER — FLUTICASONE PROPIONATE 50 UG/1
50 SPRAY, METERED NASAL
Qty: 1 | Refills: 2 | Status: DISCONTINUED | COMMUNITY
Start: 2021-04-27 | End: 2023-01-27

## 2023-01-27 RX ORDER — MELATONIN 5 MG
5 CAPSULE ORAL
Qty: 90 | Refills: 3 | Status: DISCONTINUED | COMMUNITY
Start: 2022-04-29 | End: 2023-01-27

## 2023-01-27 RX ORDER — LORATADINE 10 MG
17 TABLET,DISINTEGRATING ORAL
Qty: 1 | Refills: 1 | Status: DISCONTINUED | COMMUNITY
Start: 2021-04-27 | End: 2023-01-27

## 2023-01-27 RX ORDER — CARBIDOPA, LEVODOPA AND ENTACAPONE 50; 200; 200 MG/1; MG/1; MG/1
50-200-200 TABLET, FILM COATED ORAL 3 TIMES DAILY
Qty: 270 | Refills: 1 | Status: DISCONTINUED | COMMUNITY
Start: 2021-04-27 | End: 2023-01-27

## 2023-01-27 RX ORDER — BUDESONIDE 1 MG/2ML
1 INHALANT ORAL
Qty: 2 | Refills: 2 | Status: DISCONTINUED | COMMUNITY
Start: 2022-01-18 | End: 2023-01-27

## 2023-01-27 RX ORDER — ALBUTEROL SULFATE 90 UG/1
108 (90 BASE) AEROSOL, METERED RESPIRATORY (INHALATION)
Qty: 1 | Refills: 3 | Status: DISCONTINUED | COMMUNITY
Start: 2022-11-22 | End: 2023-01-27

## 2023-01-27 RX ORDER — FLUTICASONE PROPIONATE AND SALMETEROL 50; 250 UG/1; UG/1
250-50 POWDER RESPIRATORY (INHALATION)
Qty: 1 | Refills: 1 | Status: DISCONTINUED | COMMUNITY
Start: 2021-07-16 | End: 2023-01-27

## 2023-01-27 RX ORDER — ROSUVASTATIN CALCIUM 5 MG/1
5 TABLET, FILM COATED ORAL
Qty: 90 | Refills: 0 | Status: DISCONTINUED | COMMUNITY
Start: 2021-05-04 | End: 2023-01-27

## 2023-01-27 RX ORDER — FLUTICASONE PROPIONATE AND SALMETEROL 500; 50 UG/1; UG/1
500-50 POWDER RESPIRATORY (INHALATION) DAILY
Qty: 1 | Refills: 1 | Status: DISCONTINUED | COMMUNITY
Start: 2021-04-27 | End: 2023-01-27

## 2023-01-27 RX ORDER — SENNOSIDES 8.6 MG
8.6 TABLET ORAL
Qty: 30 | Refills: 0 | Status: DISCONTINUED | COMMUNITY
Start: 2021-04-27 | End: 2023-01-27

## 2023-01-27 RX ORDER — MAGNESIUM OXIDE 500 MG
5 TABLET ORAL
Qty: 180 | Refills: 3 | Status: DISCONTINUED | COMMUNITY
Start: 2022-08-09 | End: 2023-01-27

## 2023-01-27 RX ORDER — HYDROCHLOROTHIAZIDE 12.5 MG/1
12.5 CAPSULE ORAL
Qty: 90 | Refills: 0 | Status: DISCONTINUED | COMMUNITY
Start: 2021-07-16 | End: 2023-01-27

## 2023-02-15 NOTE — PATIENT PROFILE ADULT - PRIMARY ROLES/RESPONSIBILITIES
Appointment scheduled.
Pneumonia Symptoms   (Newest Message First)  Samy Barbour \"Dominick Aguayo\"  [facility] [place] [address] Practice Support (supporting Chadd Resendiz MD) 13 hours ago (10:06 PM)     TP  Dr. Lance Deshpande,     I have reason to believe that I may have contracted pneumonia and I would like to connect with you at your earliest opportunity in an online (tele-health) setting and/or virtual e-visit type connection with my mobile device. We have connected one other time in the past with you engaging my phone at [phone]. I hope to hear from you Wednesday morning. Thanks in advance for your time.   Iron Morales
none

## 2023-02-16 ENCOUNTER — APPOINTMENT (OUTPATIENT)
Dept: NEUROLOGY | Facility: CLINIC | Age: 83
End: 2023-02-16
Payer: MEDICARE

## 2023-02-16 VITALS
HEIGHT: 71 IN | OXYGEN SATURATION: 99 % | BODY MASS INDEX: 18.34 KG/M2 | HEART RATE: 78 BPM | DIASTOLIC BLOOD PRESSURE: 65 MMHG | TEMPERATURE: 97.4 F | WEIGHT: 131 LBS | SYSTOLIC BLOOD PRESSURE: 105 MMHG

## 2023-02-16 VITALS — HEART RATE: 84 BPM | SYSTOLIC BLOOD PRESSURE: 95 MMHG | DIASTOLIC BLOOD PRESSURE: 61 MMHG

## 2023-02-16 PROCEDURE — 99215 OFFICE O/P EST HI 40 MIN: CPT

## 2023-04-05 ENCOUNTER — NON-APPOINTMENT (OUTPATIENT)
Age: 83
End: 2023-04-05

## 2023-04-06 ENCOUNTER — APPOINTMENT (OUTPATIENT)
Dept: PULMONOLOGY | Facility: CLINIC | Age: 83
End: 2023-04-06
Payer: MEDICARE

## 2023-04-06 ENCOUNTER — NON-APPOINTMENT (OUTPATIENT)
Age: 83
End: 2023-04-06

## 2023-04-06 ENCOUNTER — OUTPATIENT (OUTPATIENT)
Dept: OUTPATIENT SERVICES | Facility: HOSPITAL | Age: 83
LOS: 1 days | End: 2023-04-06
Payer: MEDICARE

## 2023-04-06 VITALS
WEIGHT: 130 LBS | HEIGHT: 71 IN | HEART RATE: 77 BPM | OXYGEN SATURATION: 98 % | DIASTOLIC BLOOD PRESSURE: 72 MMHG | TEMPERATURE: 97.9 F | BODY MASS INDEX: 18.2 KG/M2 | SYSTOLIC BLOOD PRESSURE: 110 MMHG | RESPIRATION RATE: 12 BRPM

## 2023-04-06 DIAGNOSIS — J45.909 UNSPECIFIED ASTHMA, UNCOMPLICATED: ICD-10-CM

## 2023-04-06 DIAGNOSIS — J98.4 OTHER DISORDERS OF LUNG: ICD-10-CM

## 2023-04-06 PROCEDURE — 94060 EVALUATION OF WHEEZING: CPT

## 2023-04-06 PROCEDURE — 94729 DIFFUSING CAPACITY: CPT | Mod: 26

## 2023-04-06 PROCEDURE — 94760 N-INVAS EAR/PLS OXIMETRY 1: CPT

## 2023-04-06 PROCEDURE — 94729 DIFFUSING CAPACITY: CPT

## 2023-04-06 PROCEDURE — 99215 OFFICE O/P EST HI 40 MIN: CPT

## 2023-04-06 PROCEDURE — 94726 PLETHYSMOGRAPHY LUNG VOLUMES: CPT

## 2023-04-06 PROCEDURE — 94618 PULMONARY STRESS TESTING: CPT | Mod: 26

## 2023-04-06 PROCEDURE — 94618 PULMONARY STRESS TESTING: CPT

## 2023-04-06 PROCEDURE — 94727 GAS DIL/WSHOT DETER LNG VOL: CPT | Mod: 26

## 2023-04-06 PROCEDURE — 95012 NITRIC OXIDE EXP GAS DETER: CPT

## 2023-04-06 PROCEDURE — 94010 BREATHING CAPACITY TEST: CPT | Mod: 26

## 2023-04-06 RX ORDER — ALBUTEROL 90 UG/1
2.5 AEROSOL, METERED ORAL ONCE
Refills: 0 | Status: DISCONTINUED | OUTPATIENT
Start: 2023-04-06 | End: 2023-04-20

## 2023-04-07 NOTE — REVIEW OF SYSTEMS
[Sputum] : sputum [Dysphagia] : dysphagia [Negative] : Cardiovascular [Cough] : no cough [Hemoptysis] : no hemoptysis [Dyspnea] : no dyspnea [A.M. Dry Mouth] : no a.m. dry mouth [SOB on Exertion] : no sob on exertion [GERD] : no gerd [Abdominal Pain] : no abdominal pain [Nausea] : no nausea [Constipation] : no constipation

## 2023-04-07 NOTE — ASSESSMENT
[FreeTextEntry1] : 4-6-23:\par It was a pleasure to see Severo today in follow up. His respiratory issues are summarized:\par \par 1. Chronic cough\par Last visit Severo complained of a chronic cough with clear phlegm. Cough briefly improved with nasal rinse with nebulizers but it has since returned with yellow phlegm.\par \par Possible causes include:\par a. Asthma. He has a long history of asthma for over 40 years. He is currently only using rescue MDI, which he has not needed in 2 weeks. His coughing may be a manifestation of asthma. We ordered PFT, 6MWT to evaluate for obstructive lung defect. He has not had this done. He has multiple allergies on RAST, normal eosinophil count and IgE level. FeNo is 23. Spirometry 4/6/23 with mild obstruction.\par \par b. Post nasal drip. He has a history of allergies. He has not been using any medications for this.\par \par c. Sinusitis. His sinuses look normal on CT. This is unlikely to be the cause of his cough.\par \par d. Aspiration. He has Parkinson's. We will hold off ordering a swallow evaluation with modified barium swallow as there is nothing on the chest CT to suggest aspiration. However, we will consider further workup if repeat CT chest shows stigmata of aspiration.\par \par 2. Apical scarring and mucus plugging\par We reviewed chest x-ray from 11/12/21 with the patient. There is bilateral apical scarring, worse on the right side. We reviewed the CT of the neck from 4/6/21, which shows scarring of the right upper lobe. 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 He states he was treated for several months in and out of the hospital. Chest CT 2/3/22 shows scarring in the right upper lobe as a result of prior TB. There is evidence of pleural thickening. There is mucus in the right lower lobe bronchus. There is minimal scarring at the left base at the dome of the diaphragm.\par \par Plan:\par - We will repeat the CT chest now\par - We will repeat the PFTs and 6MWT in 6 months with PI max\par - We will begin airway clearance and patient was clearly instructed with use of albuterol nebulizer followed by hypertonic saline followed by Aerobika twice daily. He was instructed to always use albuterol before hypertonic saline. He was given an Aerobika device today in the office.\par \par 3. Parkinsonism\par 1.   Parkinson's \par We continue to ask about the following respiratory issues that are commonly seen in people with Parkinsons:\par \par A.Upper airways obstruction:\par --Hypophonia seen in up to 70% of the patients as a result of rigidity of the upper airways muscles during abduction. He does not endorse this.\par --Respiratory flutter which is produced as a result of fluttering of the vocal cords. This is picked up on the flow volume loop as a saw-tooth pattern. Flow-volume loop from spirometry 4/6/23 does not demonstrate this pattern.\par --Complete and periodic closure (spasm) of vocal cords- Flow-volume loop does not demonstrate evidence of extrathoracic obstruction.\par \par B. Restrictive lung disease\par This may be seen in up to 2-5% of the patient's and is due to:\par --Rigidity of the respiratory muscles- He does not exhibit severe peripheral rigidity currently but will continue to monitor\par --Pleuro-pulmonary fibrosis if patients are on her ergotamine drugs\par \par C. Sleep-disordered breathing which is unusual in pure Parkinson's disease and is more likely seen in multiple system atrophy. This is unlikely in this patient.\par \par D. Respiratory dyskinesia which manifests as chest pain and dyspnea. This is more commonly seen soon after levodopa treatment is started. He does not have any symptoms of shortness of breath.\par \par E. Exercise limitation manifested as shortness of breath. The patient is limited in ambulation due to use of walker.\par \par F. Aspiration pneumonia due in-coordination of deglutition muscles. Currently he does not demonstrate any evidence of aspiration on CT chest. However, as above, if repeat CT chest is remarkable or he does not improve his cough with airway clearance, he will need speech and swallow evaluation and a barium swallow.\par \par Other than possible aspiration, he lacks the rest of the symptoms. He continues with aspiration precautions. He remains off inhalers and has no breathing complaints, though exercise is significantly limited by muscle strength and walker use. Spirometry 4/6/23 with mild obstruction. 6MWT limited due to walker use. We will repeat PFTs with PI max in 6 months.\par \par 4. Cold hands\par His hands are cold on exam. Although there is no thickening of the skin, they are shiny. Autoimmune bloodwork to evaluate for collagen vascular disease was negative. This is likely related to his Parkinson's disease.\par \par Return to clinic: 6 months

## 2023-04-07 NOTE — PHYSICAL EXAM
[TextBox_2] : mild kyphosis [TextBox_105] : grade 3 clubbing [TextBox_132] : no cogwheel rigidity noted, no tremors. Parkinsonian features noted. Lack of expression

## 2023-04-07 NOTE — PROCEDURE
[FreeTextEntry1] : Spirometry 4/6/2023\par FEV1 1.92 (70%)\par FEV1/FVC 70%\par FVC 2.75 (74%)\par HGI81-06% 1.31 (68%)\par \par FeNO 4/6/2023\par 23 ppb\par \par 6MWT 4/6/2023\par Walked 150 meters; \par Resting O2 99% RA, HR 77, BP 98/62, Yuniel dyspnea 0\par End test O2 98% RA, HR 86, /7-, Yuniel dyspnea 0\par Chest CT 2/23/22\par Impression:\par 1. Evidence of remote pleural and parenchymal scarring consistent with the provided history of remote tuberculosis. No evidence of reactivation or active tuberculosis in this examination.\par 2. Bilateral subcentimeter and solid micronodules the largest measuring 5 mm within the apical posterior segment of the left upper lobe. In a low risk individual, no additional imaging surveillance is indicated. If this patient is high risk for the development of lung carcinoma, optional 12 month surveillance thoracic CT is suggested.\par 3. Clustered nonsolid nodules within the left lower lobe measuring up to 6 mm. As per Fleischner society 2017 guidelines, interval surveillance at 3-6 months to confirm persistence.\par 4. 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

## 2023-04-07 NOTE — ADDENDUM
[FreeTextEntry1] : I, Dr. Drake Prasad, personally performed the evaluation and management services for this established patient who presents today with a new problem/exacerbation of an existing condition.  The E/M includes conducting patient interview, detailed physical examination, assessing all new and exacerbated conditions, reviewing all investigations and establishing a new plan of care.  Today, my pulmonary fellow, was part of the evaluation and management.  She understands changes in the patient management.

## 2023-04-07 NOTE — DISCUSSION/SUMMARY
[FreeTextEntry1] : ATTENDING'S SUMMARY:\par 4-6-23:\par mild kyphosis\par no significant rigidity, no past point, lack of expression, no nystagmus\par no pallor, no icterus, external jugular vein is visible in the sitting position, however filling is from above\par paucity of movement\par no JVD, no hepatojugular reflux, no HSM\par no cervical adenopathy, no supraclavicular adenopathy\par normal s1/s2, no murmurs, rubs or gallops, P2 not loud or split\par good air entry bilaterally, few inspiratory crackles noted in both bases, right greater than left\par no cyanosis, no clubbing, no articular manifestations\par grade 3 clubbing, mild atrophy of interosseous muscles, cold hands\par no pedal edema

## 2023-04-07 NOTE — HISTORY OF PRESENT ILLNESS
[TextBox_4] : 81 yo M pmhx HTN, high cholesterol, arthritis, DM type II, BPH, Parkinson's\par referred by Dr. Doni Dominguez\par He coughs when he drinks water\par He does not have a neurologist\par He has had asthma for 40 years\par He has not been hospitalized for his asthma in the past 10 years. He was taking advair, but stopped a year ago\par He has allergies, uses Flonase, clear \par He has gotten the COVID vaccine and booster\par no family history of lung cancer\par He uses a stationary bike 2-3 times a week for 20 minutes\par \par Meds: advair 250/50 (NOT CURRENTLY TAKING), tamsulosin, Losartan, rosuvastatin, aspirin, metformin, carbidopa/levodopa, HCTZ\par \par 4-6-23: Feels well. Main complaint is phlegm which is yellow. Has not been hospitalized for the last year. Only uses albuterol PRN. Has albuterol nebulizer. No shortness of breath but does not leave his home. Ambulates with a walker. Not using advair. Some post nasal drip and allergies.\par \par 4-26-22:\par He is not complaining of cough\par He has been using nasal sinus rinse with budesonide nebs twice daily.

## 2023-05-02 ENCOUNTER — NON-APPOINTMENT (OUTPATIENT)
Age: 83
End: 2023-05-02

## 2023-05-02 ENCOUNTER — APPOINTMENT (OUTPATIENT)
Dept: NEUROLOGY | Facility: CLINIC | Age: 83
End: 2023-05-02
Payer: MEDICARE

## 2023-05-02 VITALS
SYSTOLIC BLOOD PRESSURE: 145 MMHG | HEART RATE: 62 BPM | BODY MASS INDEX: 18.2 KG/M2 | TEMPERATURE: 97.8 F | HEIGHT: 71 IN | WEIGHT: 130 LBS | OXYGEN SATURATION: 99 % | DIASTOLIC BLOOD PRESSURE: 79 MMHG

## 2023-05-02 DIAGNOSIS — I63.9 CEREBRAL INFARCTION, UNSPECIFIED: ICD-10-CM

## 2023-05-02 PROCEDURE — 99215 OFFICE O/P EST HI 40 MIN: CPT

## 2023-05-02 RX ORDER — POLYETHYLENE GLYCOL 3350 17 G/17G
17 POWDER, FOR SOLUTION ORAL DAILY
Qty: 1530 | Refills: 3 | Status: DISCONTINUED | COMMUNITY
Start: 2023-02-16 | End: 2023-05-02

## 2023-05-02 NOTE — ASSESSMENT
[FreeTextEntry1] : The patient is an 83-year-old male with a history of hypertension, hyperlipidemia, type 2 diabetes, former tobacco dependence, Parkinson's disease, prior stroke x2 (2018 with residual left upper extremity weakness as well as November/2022 with residual left wrist drop/hand weakness, on aspirin 81 mg daily) who is here for follow-up of his stroke history. His chronic left parietal stroke looks embolic in nature; it is not the cause of his left hand weakness; rather, it is possible R thalamic stroke may have been the cause back in 11/2022. Regardless, further eval into stroke mechanism is recommended with ILR placement. Patient will send me recent labs as well as echo report.  He will continue aspirin (technically should be on Plavix if he had 2nd stroke while on aspirin), statin therapy. Will check accumetrics w next lab draw. \par \par  RTC 3 months.

## 2023-05-02 NOTE — HISTORY OF PRESENT ILLNESS
[FreeTextEntry1] : The patient is an 83-year-old male with a history of hypertension, hyperlipidemia, type 2 diabetes, former tobacco dependence, Parkinson's disease, prior stroke x2 (2018 with residual left upper extremity weakness as well as November/2022 with residual left wrist drop/hand weakness, on aspirin 81 mg daily) who is here for follow-up of his stroke history.\par \par The patient states he had his first stroke in 2018 when he presented with left-sided weakness primarily of the arm but also the face.  Symptoms improved although not completely resolved.  He had sudden onset wrist drop and November 2022 and was admitted to a local hospital.  There, he reports he had an MRI, TTE, and vessel imaging but is unclear of the results.  His daughter states she was told they were unsure if he had a stroke. He was continued on his home aspirin.  The patient presented to our ED in December 2022 with worsening of his baseline left wrist/hand weakness which was ultimately attributed to recrudescence in the setting of pneumonia after MRI brain was negative for acute findings.  Of note, brain MRI did show a left chronic parietal embolic appearing stroke as well as a chronic right thalamic stroke.  CTA head/neck was unrevealing at that time.  The patient has remained on aspirin 81 mg daily and atorvastatin 80 mg daily.  He has never had cardiac monitoring.  He has had no recent symptoms concerning for stroke.  He was discharged to a rehab facility after his most recent admission and has made significant improvement in terms of his gait and posture.  He is actually more concerned about his Parkinson's disease and thought this visit today was with his Parkinson's team.

## 2023-05-02 NOTE — PHYSICAL EXAM
[FreeTextEntry1] : Alert.  Fully oriented.  Speech is hypophonic.  Masked facies, bradykinetic.  His left upper extremity is mildly weak 4/5 in upper motor neuron pattern predominance with positive satellite sign and drift.  He has increased tone of the left upper extremity.  Easily stands from seated to standing position.  Stooped posture with shortening of his stride.  Uses walker

## 2023-06-13 ENCOUNTER — APPOINTMENT (OUTPATIENT)
Dept: FAMILY MEDICINE | Facility: CLINIC | Age: 83
End: 2023-06-13
Payer: MEDICARE

## 2023-06-13 VITALS
DIASTOLIC BLOOD PRESSURE: 72 MMHG | TEMPERATURE: 97.9 F | RESPIRATION RATE: 13 BRPM | HEIGHT: 71 IN | BODY MASS INDEX: 19.32 KG/M2 | WEIGHT: 138 LBS | HEART RATE: 71 BPM | SYSTOLIC BLOOD PRESSURE: 106 MMHG | OXYGEN SATURATION: 98 %

## 2023-06-13 DIAGNOSIS — Z87.438 PERSONAL HISTORY OF OTHER DISEASES OF MALE GENITAL ORGANS: ICD-10-CM

## 2023-06-13 DIAGNOSIS — L98.9 DISORDER OF THE SKIN AND SUBCUTANEOUS TISSUE, UNSPECIFIED: ICD-10-CM

## 2023-06-13 PROCEDURE — 36415 COLL VENOUS BLD VENIPUNCTURE: CPT

## 2023-06-13 PROCEDURE — 99214 OFFICE O/P EST MOD 30 MIN: CPT | Mod: 25

## 2023-06-13 RX ORDER — GABAPENTIN 100 MG/1
100 CAPSULE ORAL TWICE DAILY
Refills: 0 | Status: ACTIVE | COMMUNITY

## 2023-06-13 RX ORDER — MUPIROCIN 20 MG/G
2 OINTMENT TOPICAL
Qty: 1 | Refills: 0 | Status: ACTIVE | COMMUNITY
Start: 2023-06-13 | End: 1900-01-01

## 2023-06-13 NOTE — PLAN
[FreeTextEntry1] : follow up labs, labs drawn in office \par encouraged routine follow up \par bp stable \par \par skin lesion \par top of buttock crease \par encouraged donut for sitting and switching position often \par \par also needs forms for disability

## 2023-06-13 NOTE — HISTORY OF PRESENT ILLNESS
[FreeTextEntry1] : bp check \par med refills  [de-identified] : 82 yo m presents for follow up. \par Takes meds daily, needs refills. \par Here for repeat blood work as well. \par No complaints today. \par

## 2023-07-12 LAB
25(OH)D3 SERPL-MCNC: 29.1 NG/ML
ALBUMIN SERPL ELPH-MCNC: 4.3 G/DL
ALP BLD-CCNC: 104 U/L
ALT SERPL-CCNC: 23 U/L
ANION GAP SERPL CALC-SCNC: 15 MMOL/L
AST SERPL-CCNC: 25 U/L
BILIRUB SERPL-MCNC: 0.4 MG/DL
BUN SERPL-MCNC: 19 MG/DL
CALCIUM SERPL-MCNC: 9.7 MG/DL
CHLORIDE SERPL-SCNC: 102 MMOL/L
CHOLEST SERPL-MCNC: 110 MG/DL
CO2 SERPL-SCNC: 23 MMOL/L
CREAT SERPL-MCNC: 0.74 MG/DL
EGFR: 91 ML/MIN/1.73M2
ESTIMATED AVERAGE GLUCOSE: 134 MG/DL
FOLATE SERPL-MCNC: 7.4 NG/ML
GLUCOSE SERPL-MCNC: 118 MG/DL
HBA1C MFR BLD HPLC: 6.3 %
HDLC SERPL-MCNC: 43 MG/DL
IRON SATN MFR SERPL: 19 %
IRON SERPL-MCNC: 57 UG/DL
LDLC SERPL CALC-MCNC: 60 MG/DL
NONHDLC SERPL-MCNC: 67 MG/DL
POTASSIUM SERPL-SCNC: 4.2 MMOL/L
PROT SERPL-MCNC: 7.2 G/DL
SODIUM SERPL-SCNC: 140 MMOL/L
TIBC SERPL-MCNC: 297 UG/DL
TRIGL SERPL-MCNC: 38 MG/DL
TSH SERPL-ACNC: 2.77 UIU/ML
UIBC SERPL-MCNC: 240 UG/DL
VIT B12 SERPL-MCNC: 535 PG/ML

## 2023-07-25 ENCOUNTER — NON-APPOINTMENT (OUTPATIENT)
Age: 83
End: 2023-07-25

## 2023-08-14 RX ORDER — CHOLECALCIFEROL (VITAMIN D3) 25 MCG
25 MCG TABLET ORAL
Qty: 90 | Refills: 0 | Status: ACTIVE | COMMUNITY
Start: 2021-04-27 | End: 1900-01-01

## 2023-09-01 ENCOUNTER — RX RENEWAL (OUTPATIENT)
Age: 83
End: 2023-09-01

## 2023-09-05 ENCOUNTER — RX RENEWAL (OUTPATIENT)
Age: 83
End: 2023-09-05

## 2023-09-22 ENCOUNTER — NON-APPOINTMENT (OUTPATIENT)
Age: 83
End: 2023-09-22

## 2023-09-25 ENCOUNTER — APPOINTMENT (OUTPATIENT)
Dept: CT IMAGING | Facility: CLINIC | Age: 83
End: 2023-09-25
Payer: MEDICARE

## 2023-09-25 PROCEDURE — 71250 CT THORAX DX C-: CPT

## 2023-09-26 ENCOUNTER — NON-APPOINTMENT (OUTPATIENT)
Age: 83
End: 2023-09-26

## 2023-09-26 ENCOUNTER — APPOINTMENT (OUTPATIENT)
Dept: PULMONOLOGY | Facility: CLINIC | Age: 83
End: 2023-09-26
Payer: MEDICARE

## 2023-09-26 PROCEDURE — 94010 BREATHING CAPACITY TEST: CPT

## 2023-09-26 PROCEDURE — 99215 OFFICE O/P EST HI 40 MIN: CPT | Mod: 25

## 2023-09-26 PROCEDURE — 94729 DIFFUSING CAPACITY: CPT

## 2023-09-26 PROCEDURE — 94727 GAS DIL/WSHOT DETER LNG VOL: CPT

## 2023-09-26 PROCEDURE — 94618 PULMONARY STRESS TESTING: CPT

## 2023-09-27 ENCOUNTER — APPOINTMENT (OUTPATIENT)
Dept: NEUROLOGY | Facility: CLINIC | Age: 83
End: 2023-09-27
Payer: MEDICARE

## 2023-09-27 ENCOUNTER — NON-APPOINTMENT (OUTPATIENT)
Age: 83
End: 2023-09-27

## 2023-09-27 VITALS — SYSTOLIC BLOOD PRESSURE: 116 MMHG | DIASTOLIC BLOOD PRESSURE: 72 MMHG

## 2023-09-27 VITALS
WEIGHT: 135 LBS | HEIGHT: 71 IN | OXYGEN SATURATION: 97 % | TEMPERATURE: 97.7 F | BODY MASS INDEX: 18.9 KG/M2 | DIASTOLIC BLOOD PRESSURE: 69 MMHG | SYSTOLIC BLOOD PRESSURE: 109 MMHG | HEART RATE: 59 BPM

## 2023-09-27 LAB
ANA SER IF-ACNC: NEGATIVE
CRP SERPL-MCNC: 3 MG/L
HCT VFR BLD CALC: 41.6 %
HGB BLD-MCNC: 13.2 G/DL
MCHC RBC-ENTMCNC: 30 PG
MCHC RBC-ENTMCNC: 31.7 GM/DL
MCV RBC AUTO: 94.5 FL
PLATELET # BLD AUTO: 309 K/UL
PROCALCITONIN SERPL-MCNC: 0.03 NG/ML
RBC # BLD: 4.4 M/UL
RBC # FLD: 13.1 %
RHEUMATOID FACT SER QL: <10 IU/ML
TSH SERPL-ACNC: 3.12 UIU/ML
WBC # FLD AUTO: 8.63 K/UL

## 2023-09-27 PROCEDURE — 99215 OFFICE O/P EST HI 40 MIN: CPT

## 2023-09-27 RX ORDER — GLUCOSAMINE HCL/CHONDROITIN SU 500-400 MG
3 CAPSULE ORAL
Qty: 180 | Refills: 3 | Status: ACTIVE | COMMUNITY
Start: 2023-09-27 | End: 1900-01-01

## 2023-09-27 RX ORDER — POLYETHYLENE GLYCOL 3350 17 G/17G
17 POWDER, FOR SOLUTION ORAL DAILY
Qty: 1 | Refills: 11 | Status: ACTIVE | COMMUNITY
Start: 2022-08-09 | End: 1900-01-01

## 2023-09-27 RX ORDER — CARBIDOPA AND LEVODOPA 50; 200 MG/1; MG/1
50-200 TABLET, EXTENDED RELEASE ORAL
Qty: 180 | Refills: 3 | Status: ACTIVE | COMMUNITY
Start: 2022-09-23 | End: 1900-01-01

## 2023-10-02 LAB — CRYOGLOB SERPL-MCNC: NEGATIVE

## 2023-10-10 ENCOUNTER — APPOINTMENT (OUTPATIENT)
Dept: NEUROLOGY | Facility: CLINIC | Age: 83
End: 2023-10-10

## 2023-12-06 ENCOUNTER — RX RENEWAL (OUTPATIENT)
Age: 83
End: 2023-12-06

## 2024-02-02 ENCOUNTER — APPOINTMENT (OUTPATIENT)
Dept: NEUROLOGY | Facility: CLINIC | Age: 84
End: 2024-02-02
Payer: MEDICARE

## 2024-02-02 VITALS
DIASTOLIC BLOOD PRESSURE: 71 MMHG | WEIGHT: 126 LBS | TEMPERATURE: 97.7 F | HEIGHT: 71 IN | SYSTOLIC BLOOD PRESSURE: 114 MMHG | HEART RATE: 61 BPM | BODY MASS INDEX: 17.64 KG/M2 | OXYGEN SATURATION: 97 %

## 2024-02-02 VITALS — DIASTOLIC BLOOD PRESSURE: 72 MMHG | SYSTOLIC BLOOD PRESSURE: 114 MMHG | HEART RATE: 67 BPM

## 2024-02-02 PROCEDURE — G2211 COMPLEX E/M VISIT ADD ON: CPT

## 2024-02-02 PROCEDURE — 99215 OFFICE O/P EST HI 40 MIN: CPT

## 2024-02-02 RX ORDER — MODAFINIL 100 MG/1
100 TABLET ORAL
Qty: 90 | Refills: 1 | Status: ACTIVE | COMMUNITY
Start: 2023-09-27 | End: 1900-01-01

## 2024-03-04 ENCOUNTER — RX RENEWAL (OUTPATIENT)
Age: 84
End: 2024-03-04

## 2024-03-21 ENCOUNTER — NON-APPOINTMENT (OUTPATIENT)
Age: 84
End: 2024-03-21

## 2024-03-22 PROBLEM — R05.3 CHRONIC COUGH: Status: ACTIVE | Noted: 2021-04-27

## 2024-03-22 PROBLEM — R93.89 ABNORMAL CHEST CT: Status: ACTIVE | Noted: 2023-09-26

## 2024-03-28 ENCOUNTER — OUTPATIENT (OUTPATIENT)
Dept: OUTPATIENT SERVICES | Facility: HOSPITAL | Age: 84
LOS: 1 days | End: 2024-03-28
Payer: MEDICARE

## 2024-03-28 ENCOUNTER — APPOINTMENT (OUTPATIENT)
Dept: PULMONOLOGY | Facility: CLINIC | Age: 84
End: 2024-03-28
Payer: MEDICARE

## 2024-03-28 VITALS
HEART RATE: 71 BPM | BODY MASS INDEX: 17.08 KG/M2 | RESPIRATION RATE: 12 BRPM | TEMPERATURE: 98.2 F | DIASTOLIC BLOOD PRESSURE: 71 MMHG | HEIGHT: 71 IN | SYSTOLIC BLOOD PRESSURE: 121 MMHG | OXYGEN SATURATION: 99 % | WEIGHT: 122 LBS

## 2024-03-28 DIAGNOSIS — R05.3 CHRONIC COUGH: ICD-10-CM

## 2024-03-28 DIAGNOSIS — R93.89 ABNORMAL FINDINGS ON DIAGNOSTIC IMAGING OF OTHER SPECIFIED BODY STRUCTURES: ICD-10-CM

## 2024-03-28 PROCEDURE — G2211 COMPLEX E/M VISIT ADD ON: CPT

## 2024-03-28 PROCEDURE — 71250 CT THORAX DX C-: CPT

## 2024-03-28 PROCEDURE — 99215 OFFICE O/P EST HI 40 MIN: CPT

## 2024-03-28 PROCEDURE — 71250 CT THORAX DX C-: CPT | Mod: 26,MH

## 2024-03-28 RX ORDER — INHALER,ASSIST DEVICE,ACCESORY
EACH MISCELLANEOUS
Qty: 1 | Refills: 0 | Status: ACTIVE | COMMUNITY
Start: 2023-09-26 | End: 1900-01-01

## 2024-03-28 RX ORDER — SODIUM CHLORIDE SOLN NEBU 7% 7 %
7 NEBU SOLN INHALATION TWICE DAILY
Qty: 1 | Refills: 5 | Status: COMPLETED | COMMUNITY
Start: 2023-04-06 | End: 2024-03-28

## 2024-03-28 RX ORDER — MUCUS CLEARING DEVICE
EACH MISCELLANEOUS
Qty: 1 | Refills: 0 | Status: ACTIVE | COMMUNITY
Start: 2024-03-28 | End: 1900-01-01

## 2024-03-28 RX ORDER — SODIUM CHLORIDE FOR INHALATION 3 %
3 VIAL, NEBULIZER (ML) INHALATION TWICE DAILY
Qty: 1 | Refills: 5 | Status: COMPLETED | COMMUNITY
Start: 2023-09-26 | End: 2024-03-28

## 2024-03-28 NOTE — ASSESSMENT
[FreeTextEntry1] : 3-28-24:  It was a pleasure to see Severo today in follow up. His respiratory issues are summarized:  1. Chronic cough Severo complains of a chronic cough with yellow phlegm. He chokes while drinking liquids.  Possible causes include: a. Asthma. He has a long history of asthma for over 40 years. He is currently only using rescue MDI, which he has not needed in 2 weeks. His coughing may be a manifestation of asthma. He has multiple allergies on RAST, normal eosinophil count and IgE level. FeNo is 23. Spirometry on 9/26/23 with mild obstruction (FEV1/FVC: 67%).  b. Post nasal drip. He has a history of allergies. He has not been using any medications for this.  c. Sinusitis. His sinuses look normal on CT. This is unlikely to be the cause of his cough.  d. Aspiration. He has Parkinson's. We will order a swallow evaluation with modified barium swallow.   2. Apical scarring and mucus plugging Reviewed CT done 9/25/23. There is scarring noted at the left apex, right side with fibrotic bands suggestive of old granulomatous disease. There is evidence of motion artifact. There is a dense opacity noted at the right lower lobe, medially, suggestive of scarring. This opacity was not present in February. It is in the dependent portion of the lung. He has a productive cough with yellow phlegm, no fevers. It most likely represents mucus and airway impaction.   Return to clinic: - repeat CT at this time - Prescribed Aerobika with hypertonic saline 7% nebs  3. Parkinsonism We continue to ask about the following respiratory issues that are commonly seen in people with Parkinsons:  A.Upper airways obstruction: --Hypophonia seen in up to 70% of the patients as a result of rigidity of the upper airways muscles during abduction. He does not endorse this. --Respiratory flutter which is produced as a result of fluttering of the vocal cords. This is picked up on the flow volume loop as a saw-tooth pattern. Flow-volume loop from spirometry 9/26/23 does not demonstrate this pattern. --Complete and periodic closure (spasm) of vocal cords- Flow-volume loop does not demonstrate evidence of extrathoracic obstruction.  B. Restrictive lung disease This may be seen in up to 2-5% of the patient's and is due to: --Rigidity of the respiratory muscles- He does not exhibit severe peripheral rigidity currently but will continue to monitor --Pleuro-pulmonary fibrosis if patients are on her ergotamine drugs  C. Sleep-disordered breathing which is unusual in pure Parkinson's disease and is more likely seen in multiple system atrophy. This is unlikely in this patient.  D. Respiratory dyskinesia which manifests as chest pain and dyspnea. This is more commonly seen soon after levodopa treatment is started. He does not have any symptoms of shortness of breath.  E. Exercise limitation manifested as shortness of breath. The patient is limited in ambulation due to use of walker/cane.  F. Aspiration pneumonia due in-coordination of deglutition muscles. We will refer for barium swallow and speech and swallow evaluation.  Other than possible aspiration, he lacks the rest of the symptoms. He continues with aspiration precautions. He remains off inhalers and has no breathing complaints, though exercise is significantly limited by muscle strength and walker use. Spirometry 9/26/23 with mild obstruction. 6MWT limited due to walker use. He is due for PFTs with PI max before next visit.  4. Cold hands His hands are cold on exam. 2(+) clubbing; thickening of skin on dorsum of hands. Autoimmune bloodwork to evaluate for collagen vascular disease was negative. This is likely related to his Parkinson's disease.   Return to clinic: 6 months

## 2024-03-28 NOTE — PHYSICAL EXAM
[No Acute Distress] : no acute distress [Normal Oropharynx] : normal oropharynx [Normal Appearance] : normal appearance [No Neck Mass] : no neck mass [Normal Rate/Rhythm] : normal rate/rhythm [Normal S1, S2] : normal s1, s2 [No Murmurs] : no murmurs [No Resp Distress] : no resp distress [Clear to Auscultation Bilaterally] : clear to auscultation bilaterally [No Abnormalities] : no abnormalities [Benign] : benign [Normal Gait] : normal gait [No Cyanosis] : no cyanosis [No Edema] : no edema [FROM] : FROM [Normal Color/ Pigmentation] : normal color/ pigmentation [Oriented x3] : oriented x3 [Normal Affect] : normal affect [TextBox_2] : mild kyphosis [TextBox_68] : Few inspiratory crackles noted at the right base posteriorly [TextBox_105] : 2(+) clubbing; thickening of skin on dorsum of hands [TextBox_132] : no cogwheel rigidity noted, no tremors. Parkinsonian features noted. Lack of expression

## 2024-03-28 NOTE — CONSULT LETTER
[Dear  ___] : Dear ~DIONNE, [Consult Letter:] : I had the pleasure of evaluating your patient, [unfilled]. [Consult Closing:] : Thank you very much for allowing me to participate in the care of this patient.  If you have any questions, please do not hesitate to contact me. [Sincerely,] : Sincerely, [FreeTextEntry2] : Doni Dominguez MD\[facility]

## 2024-03-28 NOTE — HISTORY OF PRESENT ILLNESS
[TextBox_4] : 85 yo M pmhx HTN, high cholesterol, arthritis, DM type II, BPH, Parkinson's referred by Dr. Doni Dominguez He coughs when he drinks water He does not have a neurologist He has had asthma for 40 years He has not been hospitalized for his asthma in the past 10 years. He was taking advair, but stopped a year ago He has allergies, uses Flonase, clear  He has gotten the COVID vaccine and booster no family history of lung cancer He uses a stationary bike 2-3 times a week for 20 minutes  Meds: advair 250/50 (NOT CURRENTLY TAKING), tamsulosin, Losartan, rosuvastatin, aspirin, metformin, carbidopa/levodopa, HCTZ  3-28-24: He has difficulty with liquids, sometimes chokes he has lost 13 pounds since September no fevers, no night sweats he brings up yellow phlegm  9-26-23: Patient feels well. He denies cough or DERAS. He is able to walk on flat ground-5-6 blocks without having to stop. No weight loss or fevers, no H/o aspiration pneumonias. Walks with cane.   4-6-23: Feels well. Main complaint is phlegm which is yellow. Has not been hospitalized for the last year. Only uses albuterol PRN. Has albuterol nebulizer. No shortness of breath but does not leave his home. Ambulates with a walker. Not using advair. Some post nasal drip and allergies.  4-26-22: He is not complaining of cough He has been using nasal sinus rinse with budesonide nebs twice daily.

## 2024-03-28 NOTE — REVIEW OF SYSTEMS
[Sputum] : sputum [Dysphagia] : dysphagia [Negative] : Endocrine [Recent Wt Loss (___ Lbs)] : ~T recent [unfilled] lb weight loss [Cough] : cough [Hemoptysis] : no hemoptysis [Dyspnea] : no dyspnea [A.M. Dry Mouth] : no a.m. dry mouth [GERD] : no gerd [SOB on Exertion] : no sob on exertion [Abdominal Pain] : no abdominal pain [Nausea] : no nausea [Constipation] : no constipation

## 2024-03-28 NOTE — PROCEDURE
[FreeTextEntry1] : PFT 23 FVC: 2.76 L (73%)--> 2.91 L (77%)   FEV1:1.84 L (64%)--> 1.99 L (69%)   FEV1/FVC: 67%--> 68% SXO08-91%: 1.17 L/s (55%)--> 1.32 L/s (62%) T.66 L (82%) RV/T% DLCO: 21.46 (89%)  6MWT 23: 280 meters, SpO2 99% -> 95%  23 CT CHEST Since 2/3/2022:  New right lower lobe opacity distal to mucous occluded bronchi, likely pneumonia or obstructive atelectasis. Recommend 8 week follow-up to assess for clearance.  New mild bronchiolar impaction in the left lower lobe.  Unchanged other small lung nodules and bilateral upper lobe linear scarring ****** Spirometry 2023 FEV1 1.92 (70%) FEV1/FVC 70% FVC 2.75 (74%) KGM75-38% 1.31 (68%)  FeNO 2023 23 ppb  6MWT 2023 Walked 150 meters;  Resting O2 99% RA, HR 77, BP 98/62, Yuniel dyspnea 0 End test O2 98% RA, HR 86, /7-, Yuniel dyspnea 0  Chest CT 22 Impression: 1. Evidence of remote pleural and parenchymal scarring consistent with the provided history of remote tuberculosis. No evidence of reactivation or active tuberculosis in this examination. 2. Bilateral subcentimeter and solid micronodules the largest measuring 5 mm within the apical posterior segment of the left upper lobe. In a low risk individual, no additional imaging surveillance is indicated. If this patient is high risk for the development of lung carcinoma, optional 12 month surveillance thoracic CT is suggested. 3. Clustered nonsolid nodules within the left lower lobe measuring up to 6 mm. As per Fleischner society 2017 guidelines, interval surveillance at 3-6 months to confirm persistence. 4. 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  Chest x-ray 21 Impression: biapical scarring

## 2024-03-28 NOTE — DISCUSSION/SUMMARY
[FreeTextEntry1] : ATTENDING'S SUMMARY: 3-28-24: mild kyphosis bi temporal wasting, rigidity of the body, no past point, lack of expression, no nystagmus 2(+) clubbing; thickening of skin on dorsum of hands no pallor, no icterus, external jugular vein is visible in the sitting position, however filling is from above no JVD, no hepatojugular reflux, no HSM no cervical adenopathy, no supraclavicular adenopathy normal s1/s2, no murmurs, rubs or gallops, P2 not loud or split Few inspiratory crackles noted at the right base posteriorly no cyanosis, mild atrophy of interosseous muscles, cold hands no pedal edema

## 2024-03-28 NOTE — ADDENDUM
[FreeTextEntry1] :    I, Dr. Drake Prasad, personally performed the evaluation and management (E/M) services for this established patient who presents today with (a) new problem(s)/exacerbation of (an) existing condition(s). That E/M includes conducting the clinically appropriate interval history &/or exam, assessing all new/exacerbated conditions, and establishing a new plan of care. Today, my DIMITRIOS, Ms. Sariah Reed was here to observe my evaluation and management service for this new problem/exacerbated condition and follow the plan of care established by me going forward.

## 2024-03-29 ENCOUNTER — NON-APPOINTMENT (OUTPATIENT)
Age: 84
End: 2024-03-29

## 2024-03-29 LAB
ALBUMIN SERPL ELPH-MCNC: 3.9 G/DL
ALP BLD-CCNC: 95 U/L
ALT SERPL-CCNC: 14 U/L
ANION GAP SERPL CALC-SCNC: 11 MMOL/L
AST SERPL-CCNC: 20 U/L
BASOPHILS # BLD AUTO: 0.05 K/UL
BASOPHILS NFR BLD AUTO: 0.6 %
BILIRUB SERPL-MCNC: 0.4 MG/DL
BUN SERPL-MCNC: 24 MG/DL
CALCIUM SERPL-MCNC: 9.5 MG/DL
CHLORIDE SERPL-SCNC: 100 MMOL/L
CO2 SERPL-SCNC: 27 MMOL/L
CREAT SERPL-MCNC: 0.71 MG/DL
EGFR: 92 ML/MIN/1.73M2
EOSINOPHIL # BLD AUTO: 0.11 K/UL
EOSINOPHIL NFR BLD AUTO: 1.3 %
GLUCOSE SERPL-MCNC: 160 MG/DL
HCT VFR BLD CALC: 38.5 %
HGB BLD-MCNC: 11.9 G/DL
IMM GRANULOCYTES NFR BLD AUTO: 0.1 %
LYMPHOCYTES # BLD AUTO: 1.63 K/UL
LYMPHOCYTES NFR BLD AUTO: 19.6 %
MAN DIFF?: NORMAL
MCHC RBC-ENTMCNC: 29 PG
MCHC RBC-ENTMCNC: 30.9 GM/DL
MCV RBC AUTO: 93.7 FL
MONOCYTES # BLD AUTO: 0.7 K/UL
MONOCYTES NFR BLD AUTO: 8.4 %
NEUTROPHILS # BLD AUTO: 5.81 K/UL
NEUTROPHILS NFR BLD AUTO: 70 %
PLATELET # BLD AUTO: 328 K/UL
POTASSIUM SERPL-SCNC: 4.6 MMOL/L
PROCALCITONIN SERPL-MCNC: 0.04 NG/ML
PROT SERPL-MCNC: 7.5 G/DL
RBC # BLD: 4.11 M/UL
RBC # FLD: 14.6 %
SODIUM SERPL-SCNC: 138 MMOL/L
WBC # FLD AUTO: 8.31 K/UL

## 2024-04-02 ENCOUNTER — NON-APPOINTMENT (OUTPATIENT)
Age: 84
End: 2024-04-02

## 2024-04-03 LAB
BACTERIA SPT CULT: NORMAL
BACTERIA SPT CULT: NORMAL

## 2024-04-04 ENCOUNTER — NON-APPOINTMENT (OUTPATIENT)
Age: 84
End: 2024-04-04

## 2024-04-08 ENCOUNTER — NON-APPOINTMENT (OUTPATIENT)
Age: 84
End: 2024-04-08

## 2024-04-08 ENCOUNTER — APPOINTMENT (OUTPATIENT)
Dept: HEART AND VASCULAR | Facility: CLINIC | Age: 84
End: 2024-04-08
Payer: MEDICARE

## 2024-04-08 VITALS
OXYGEN SATURATION: 99 % | HEART RATE: 87 BPM | DIASTOLIC BLOOD PRESSURE: 64 MMHG | WEIGHT: 124 LBS | BODY MASS INDEX: 17.36 KG/M2 | HEIGHT: 71 IN | TEMPERATURE: 97.9 F | SYSTOLIC BLOOD PRESSURE: 104 MMHG

## 2024-04-08 DIAGNOSIS — R94.31 ABNORMAL ELECTROCARDIOGRAM [ECG] [EKG]: ICD-10-CM

## 2024-04-08 PROCEDURE — 93000 ELECTROCARDIOGRAM COMPLETE: CPT

## 2024-04-08 PROCEDURE — 99214 OFFICE O/P EST MOD 30 MIN: CPT

## 2024-04-09 RX ORDER — TIMOLOL MALEATE 5 MG/ML
0.5 SOLUTION/ DROPS OPHTHALMIC DAILY
Refills: 0 | Status: ACTIVE | COMMUNITY

## 2024-04-09 RX ORDER — BRIMONIDINE TARTRATE 2 MG/MG
0.2 SOLUTION/ DROPS OPHTHALMIC
Refills: 0 | Status: ACTIVE | COMMUNITY

## 2024-04-09 NOTE — REASON FOR VISIT
[FreeTextEntry1] : 83 y/o M with HTN, HLD(LDL 76), DM (A1c 6.5), mildly dilated Ao, Mild coronary calcification on chest CT, abnormal chest CT, Parkinson's Dz  4/9/24 parkinsons dz has advanced, getting work up for cough with sputum production with bronchoscopy.  has had consistently low BPs. no dizziness. no CP. wife in office and dtr on the phone for apt.

## 2024-04-09 NOTE — ASSESSMENT
[FreeTextEntry1] : HTN- Well controlled. 4/9/24 d/t age and high fall risk, will reduce losartan to 25mg.   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 Will obtain echo to assess for calcification of the mitral and aortic valves and there respective annuli. 4/9/24 did not get echo -will update

## 2024-04-10 LAB
APTT BLD: 33.1 SEC
INR PPP: 1.26 RATIO
PT BLD: 14.1 SEC

## 2024-04-15 ENCOUNTER — APPOINTMENT (OUTPATIENT)
Dept: HEART AND VASCULAR | Facility: CLINIC | Age: 84
End: 2024-04-15
Payer: MEDICARE

## 2024-04-15 PROCEDURE — 93306 TTE W/DOPPLER COMPLETE: CPT

## 2024-04-23 RX ORDER — ALBUTEROL SULFATE 90 UG/1
108 (90 BASE) AEROSOL, METERED RESPIRATORY (INHALATION) EVERY 6 HOURS
Qty: 1 | Refills: 5 | Status: ACTIVE | COMMUNITY
Start: 2022-02-03 | End: 1900-01-01

## 2024-04-24 ENCOUNTER — RX RENEWAL (OUTPATIENT)
Age: 84
End: 2024-04-24

## 2024-04-25 ENCOUNTER — NON-APPOINTMENT (OUTPATIENT)
Age: 84
End: 2024-04-25

## 2024-04-26 ENCOUNTER — OUTPATIENT (OUTPATIENT)
Dept: OUTPATIENT SERVICES | Facility: HOSPITAL | Age: 84
LOS: 1 days | Discharge: HOME | End: 2024-04-26
Payer: MEDICARE

## 2024-04-26 ENCOUNTER — APPOINTMENT (OUTPATIENT)
Dept: PULMONOLOGY | Facility: HOSPITAL | Age: 84
End: 2024-04-26

## 2024-04-26 ENCOUNTER — APPOINTMENT (OUTPATIENT)
Dept: RADIOLOGY | Facility: HOSPITAL | Age: 84
End: 2024-04-26

## 2024-04-26 ENCOUNTER — APPOINTMENT (OUTPATIENT)
Dept: NEUROLOGY | Facility: CLINIC | Age: 84
End: 2024-04-26

## 2024-04-26 VITALS — WEIGHT: 125 LBS

## 2024-04-26 LAB
GLUCOSE BLDC GLUCOMTR-MCNC: 116 MG/DL — HIGH (ref 70–99)
GLUCOSE BLDC GLUCOMTR-MCNC: 126 MG/DL — HIGH (ref 70–99)

## 2024-04-26 PROCEDURE — 31645 BRNCHSC W/THER ASPIR 1ST: CPT

## 2024-04-26 PROCEDURE — 87077 CULTURE AEROBIC IDENTIFY: CPT

## 2024-04-26 PROCEDURE — 31645 BRNCHSC W/THER ASPIR 1ST: CPT | Mod: GC

## 2024-04-26 PROCEDURE — 87070 CULTURE OTHR SPECIMN AEROBIC: CPT

## 2024-04-26 PROCEDURE — 87116 MYCOBACTERIA CULTURE: CPT

## 2024-04-26 PROCEDURE — 87015 SPECIMEN INFECT AGNT CONCNTJ: CPT

## 2024-04-26 PROCEDURE — 82962 GLUCOSE BLOOD TEST: CPT

## 2024-04-26 PROCEDURE — 87206 SMEAR FLUORESCENT/ACID STAI: CPT

## 2024-04-26 PROCEDURE — 87102 FUNGUS ISOLATION CULTURE: CPT

## 2024-04-26 NOTE — PRE-ANESTHESIA EVALUATION ADULT - NSANTHOSAYNRD_GEN_A_CORE
No. CODIE screening performed.  STOP BANG Legend: 0-2 = LOW Risk; 3-4 = INTERMEDIATE Risk; 5-8 = HIGH Risk

## 2024-04-27 LAB
GRAM STN FLD: ABNORMAL
NIGHT BLUE STAIN TISS: SIGNIFICANT CHANGE UP
SPECIMEN SOURCE: SIGNIFICANT CHANGE UP
SPECIMEN SOURCE: SIGNIFICANT CHANGE UP

## 2024-04-28 LAB
CULTURE RESULTS: ABNORMAL
SPECIMEN SOURCE: SIGNIFICANT CHANGE UP

## 2024-05-01 LAB
FUNGUS SPT CULT: ABNORMAL
FUNGUS SPT CULT: ABNORMAL

## 2024-05-02 ENCOUNTER — NON-APPOINTMENT (OUTPATIENT)
Age: 84
End: 2024-05-02

## 2024-05-06 RX ORDER — SODIUM CHLORIDE FOR INHALATION 7 %
7 VIAL, NEBULIZER (ML) INHALATION
Qty: 1 | Refills: 2 | Status: ACTIVE | COMMUNITY
Start: 2024-03-28 | End: 1900-01-01

## 2024-05-07 ENCOUNTER — APPOINTMENT (OUTPATIENT)
Dept: FAMILY MEDICINE | Facility: CLINIC | Age: 84
End: 2024-05-07
Payer: MEDICARE

## 2024-05-07 VITALS
HEART RATE: 77 BPM | OXYGEN SATURATION: 98 % | WEIGHT: 127 LBS | DIASTOLIC BLOOD PRESSURE: 72 MMHG | TEMPERATURE: 98.4 F | SYSTOLIC BLOOD PRESSURE: 114 MMHG | BODY MASS INDEX: 17.78 KG/M2 | HEIGHT: 71 IN

## 2024-05-07 DIAGNOSIS — K64.4 RESIDUAL HEMORRHOIDAL SKIN TAGS: ICD-10-CM

## 2024-05-07 DIAGNOSIS — E11.65 TYPE 2 DIABETES MELLITUS WITH HYPERGLYCEMIA: ICD-10-CM

## 2024-05-07 DIAGNOSIS — M19.90 UNSPECIFIED OSTEOARTHRITIS, UNSPECIFIED SITE: ICD-10-CM

## 2024-05-07 PROCEDURE — 99214 OFFICE O/P EST MOD 30 MIN: CPT

## 2024-05-07 PROCEDURE — 36415 COLL VENOUS BLD VENIPUNCTURE: CPT

## 2024-05-07 PROCEDURE — G2211 COMPLEX E/M VISIT ADD ON: CPT

## 2024-05-07 RX ORDER — ATORVASTATIN CALCIUM 80 MG/1
80 TABLET, FILM COATED ORAL DAILY
Qty: 90 | Refills: 0 | Status: ACTIVE | COMMUNITY
Start: 1900-01-01 | End: 1900-01-01

## 2024-05-07 RX ORDER — TAMSULOSIN HYDROCHLORIDE 0.4 MG/1
0.4 CAPSULE ORAL
Qty: 90 | Refills: 0 | Status: ACTIVE | COMMUNITY
Start: 2021-04-27 | End: 1900-01-01

## 2024-05-07 RX ORDER — ASPIRIN 325 MG/1
325 TABLET, FILM COATED ORAL DAILY
Qty: 90 | Refills: 0 | Status: ACTIVE | COMMUNITY
Start: 2021-04-27 | End: 1900-01-01

## 2024-05-07 RX ORDER — METFORMIN HYDROCHLORIDE 500 MG/1
500 TABLET, COATED ORAL
Qty: 180 | Refills: 0 | Status: ACTIVE | COMMUNITY
Start: 2021-04-27 | End: 1900-01-01

## 2024-05-07 NOTE — PHYSICAL EXAM
[Normal Sclera/Conjunctiva] : normal sclera/conjunctiva [Normal Outer Ear/Nose] : the outer ears and nose were normal in appearance [Normal] : affect was normal and insight and judgment were intact [FreeTextEntry1] : chaperoned and assisted by SALVATORE Bhagat and home attendant and patients wife, external hemorrhoid

## 2024-05-07 NOTE — HEALTH RISK ASSESSMENT
[0] : 2) Feeling down, depressed, or hopeless: Not at all (0) [PHQ-2 Negative - No further assessment needed] : PHQ-2 Negative - No further assessment needed [Never] : Never [[id number]] : 0

## 2024-05-07 NOTE — HISTORY OF PRESENT ILLNESS
[FreeTextEntry1] : med refills  bp check  [de-identified] : 83 yo m presents for follow up.  Takes meds daily, needs refills.  Here for repeat blood work as well.

## 2024-05-09 ENCOUNTER — APPOINTMENT (OUTPATIENT)
Dept: COLORECTAL SURGERY | Facility: CLINIC | Age: 84
End: 2024-05-09
Payer: MEDICARE

## 2024-05-09 ENCOUNTER — NON-APPOINTMENT (OUTPATIENT)
Age: 84
End: 2024-05-09

## 2024-05-09 VITALS
DIASTOLIC BLOOD PRESSURE: 74 MMHG | WEIGHT: 127 LBS | HEART RATE: 70 BPM | TEMPERATURE: 98.4 F | SYSTOLIC BLOOD PRESSURE: 115 MMHG | BODY MASS INDEX: 17.78 KG/M2 | HEIGHT: 71 IN

## 2024-05-09 PROCEDURE — 46221 LIGATION OF HEMORRHOID(S): CPT

## 2024-05-09 PROCEDURE — 99203 OFFICE O/P NEW LOW 30 MIN: CPT | Mod: 25

## 2024-05-09 NOTE — PHYSICAL EXAM
[Abdomen Tenderness] : ~T No ~M abdominal tenderness [JVD] : no jugular venous distention  [Normal Breath Sounds] : Normal breath sounds [No Rash or Lesion] : No rash or lesion [Alert] : alert [Calm] : calm [de-identified] : Well appearing male in NAD [de-identified] : MMM [de-identified] : Limited range of motion with shuffling gait, and cane dependent. [FreeTextEntry1] : The pt was examined in the left lateral decubitus position with a medical assistant present for the entirety of the examination. Visual examination of the anal verge with effacement of the buttocks revealed a soft LL external hemorrhoid without thrombosis otherwise no masses, ulcerations, fissures or skin rashes. Digital rectal exam revealed no palpable mass or blood with sphincter tone within normal limits. A lubricated anoscope was then inserted revealing healthy appearing distal rectal mucosa and anoderm with grade I/II RA and RP columns, and grade II/III LL, noninflamed internal hemorrhoids.  I discussed with the patient the risks, benefits and alternatives to surgical management of symptomatic internal hemorrhoids and the patient wished to proceed with a trial of rubber band ligation. As such, with the anoscope in place the LL hemorrhoidal column was isolated and a rubber band successfully deployed proximal to the dentate line without difficulty. The patient tolerated the exam well.

## 2024-05-09 NOTE — HISTORY OF PRESENT ILLNESS
[FreeTextEntry1] : 84M with a hx of Parkinson's, referred by Dr. Dominguez for hemorrhoidal symptoms. He reports chronic straining with defecation with associated burning and reducible prolapse. Denies bleeding. Afraid to take laxatives because he reports mild-moderate feculent incontinence. He notes that he has been taking something more regularly that has facilitated daily formed bowel movements without as much straining and denies any recent incontinence.   PMH: Parkinson's, Chronic constipation, Stroke, HTN, HLD, DM, Asthma, Arthritis Meds: ASA, Statin, Carbidopa-Levodopa, Gabapentin, Metformin, Flomax, Ventolin All: NKDA PSH: Hernia repair FH: Denies CRC/IBD Cscope: 7 years prior and reportedly WNL

## 2024-05-09 NOTE — PROCEDURE
[FreeTextEntry1] : Anoscopy with rubber band ligation of the LL internal hemorrhoidal column as described above.

## 2024-05-20 LAB
ACID FAST STN SPT: NORMAL
ACID FAST STN SPT: NORMAL

## 2024-05-22 ENCOUNTER — APPOINTMENT (OUTPATIENT)
Dept: PULMONOLOGY | Facility: CLINIC | Age: 84
End: 2024-05-22
Payer: MEDICARE

## 2024-05-22 VITALS
BODY MASS INDEX: 17.22 KG/M2 | HEART RATE: 71 BPM | WEIGHT: 123 LBS | DIASTOLIC BLOOD PRESSURE: 74 MMHG | OXYGEN SATURATION: 94 % | HEIGHT: 71 IN | TEMPERATURE: 98.6 F | SYSTOLIC BLOOD PRESSURE: 117 MMHG

## 2024-05-22 LAB
ALBUMIN SERPL ELPH-MCNC: 3.9 G/DL
ALP BLD-CCNC: 107 U/L
ALT SERPL-CCNC: 30 U/L
ANION GAP SERPL CALC-SCNC: 11 MMOL/L
AST SERPL-CCNC: 24 U/L
BILIRUB SERPL-MCNC: 0.4 MG/DL
BUN SERPL-MCNC: 13 MG/DL
CALCIUM SERPL-MCNC: 9.6 MG/DL
CHLORIDE SERPL-SCNC: 97 MMOL/L
CHOLEST SERPL-MCNC: 98 MG/DL
CO2 SERPL-SCNC: 27 MMOL/L
CREAT SERPL-MCNC: 0.73 MG/DL
EGFR: 91 ML/MIN/1.73M2
ESTIMATED AVERAGE GLUCOSE: 137 MG/DL
GLUCOSE SERPL-MCNC: 143 MG/DL
HBA1C MFR BLD HPLC: 6.4 %
HDLC SERPL-MCNC: 36 MG/DL
LDLC SERPL CALC-MCNC: 51 MG/DL
NONHDLC SERPL-MCNC: 62 MG/DL
POTASSIUM SERPL-SCNC: 4.6 MMOL/L
PROT SERPL-MCNC: 7.5 G/DL
SODIUM SERPL-SCNC: 135 MMOL/L
TRIGL SERPL-MCNC: 39 MG/DL

## 2024-05-22 PROCEDURE — 94664 DEMO&/EVAL PT USE INHALER: CPT

## 2024-05-22 PROCEDURE — 99215 OFFICE O/P EST HI 40 MIN: CPT | Mod: 25

## 2024-05-22 PROCEDURE — G2212 PROLONG OUTPT/OFFICE VIS: CPT

## 2024-05-22 PROCEDURE — G2211 COMPLEX E/M VISIT ADD ON: CPT

## 2024-05-22 RX ORDER — LEVOFLOXACIN 500 MG/1
500 TABLET, FILM COATED ORAL DAILY
Qty: 7 | Refills: 0 | Status: ACTIVE | COMMUNITY
Start: 2024-05-22 | End: 1900-01-01

## 2024-05-23 RX ORDER — SODIUM CHLORIDE SOLN NEBU 7% 7 %
7 NEBU SOLN INHALATION
Qty: 1 | Refills: 5 | Status: ACTIVE | COMMUNITY
Start: 2024-05-22 | End: 1900-01-01

## 2024-05-23 NOTE — ASSESSMENT
[FreeTextEntry1] : 83 yo M pmhx HTN, high cholesterol, arthritis, asthma (previously on Advair), allergies (on Flonase), DM type II, BPH, Parkinson's. Referred by Dr. Prasad to evaluation and management of chronic productive cough and suspected recurrent aspiration  --sputum cx --levaquin 500mg/d x 7 days for H flu  --airway clearance regimen as follows (twice a day): 1. Albuterol 2. Hypertonic saline neb     - while wearing the Vest (when it arrives) 3. Mena cough (demonstrated) Due to pt's PD, it is difficult for him to mainain adequate seal around the Aerobika mouthpeace making the use of OPEP device ineffective and necessitating the use of HFCWO (Vest therapy) --swallow evaluation: -referral to SLP -barium esophagram  RTC in 3 weeks will schedule for airway clearance teaching session with RT the day of return visit

## 2024-05-23 NOTE — HISTORY OF PRESENT ILLNESS
[Former] : former [TextBox_4] : 85 yo M pmhx HTN, high cholesterol, arthritis, asthma (previously on Advair), allergies (on Flonase), DM type II, BPH, Parkinson's. Referred by Dr. Prasad to evaluation and management of chronic productive cough and suspected recurrent aspiration  Chronic cough with yellow phlegm. Chokes while drinking liquids.  Sp/Sw testing ordered: not performed Bronchoscopy 5/2/24: no endobronchial obstruction, rather large amount of secretion noted in lower bronchus intermedius and right lower lobe. Large amount of secretion was also noted in larynx during placement of LMA. Cx is growing haemophilus influenzae Airway clearance regimen: Aerobika with hypertonic saline 7% nebs Smartvest was prescribed (pending insurance clearance) Referred to [facility] for airway clearance  5/22/24 Presents for evaluation c/o productive cough, day and night phlegm white, yellow, dark green Diet: soup with rice and beans, apple souce, smoothies, purees

## 2024-05-23 NOTE — RESULTS/DATA
[TextEntry] : RADIOLOGY  23 CT CHEST Since 2/3/2022:  New right lower lobe opacity distal to mucous occluded bronchi, likely pneumonia or obstructive atelectasis. Recommend 8 week follow-up to assess for clearance.  New mild bronchiolar impaction in the left lower lobe.  Unchanged other small lung nodules and bilateral upper lobe linear scarring   Chest CT 22 Impression: 1. Evidence of remote pleural and parenchymal scarring consistent with the provided history of remote tuberculosis. No evidence of reactivation or active tuberculosis in this examination. 2. Bilateral subcentimeter and solid micronodules the largest measuring 5 mm within the apical posterior segment of the left upper lobe. In a low risk individual, no additional imaging surveillance is indicated. If this patient is high risk for the development of lung carcinoma, optional 12 month surveillance thoracic CT is suggested. 3. Clustered nonsolid nodules within the left lower lobe measuring up to 6 mm. As per Fleischner society 2017 guidelines, interval surveillance at 3-6 months to confirm persistence. 4. 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  Chest x-ray 21 Impression: biapical scarring   PFT   23 FVC: 2.76 L (73%)--> 2.91 L (77%) FEV1:1.84 L (64%)--> 1.99 L (69%) FEV1/FVC: 67%--> 68% [id number]%: 1.17 L/s (55%)--> 1.32 L/s (62%) T.66 L (82%) RV/T% DLCO: 21.46 (89%) 6MWT 23: 280 meters, SpO2 99% -> 95%  Spirometry 2023 FEV1 1.92 (70%) FEV1/FVC 70% FVC 2.75 (74%) [id number]% 1.31 (68%)  FeNO 2023 23 ppb  6MWT 2023 Walked 150 meters; Resting O2 99% RA, HR 77, BP 98/62, Yuniel dyspnea 0 End test O2 98% RA, HR 86, /7-, Yuniel dyspnea 0   EKG / ECHO      MICRO     PATH    OTHER LABS OF NOTE

## 2024-05-23 NOTE — PHYSICAL EXAM
[TextEntry] : Gen: Pleasant elderly man in no distress who looks stopped; speech is slow; hypomimia present HEENT: Sclera non-icteric, conjunctiva non-injected; oropharynx clear w/o thrush;  Neck: supple w/o cervical LAD; no bruits CV: Regular rate and rhythm, no murmurs, rubs or gallops Lungs: CTAB, no rales, wheezes, squeaks or rhonchi; able to cough and expectorate Extremities: no clubbing, cyanosis or edema Skin: no rash Psych: normal mood and affect Neuro:  non-focal

## 2024-05-24 RX ORDER — CARBIDOPA, LEVODOPA AND ENTACAPONE 37.5; 150; 2 MG/1; MG/1; MG/1
37.5-150-2 TABLET, FILM COATED ORAL
Qty: 360 | Refills: 3 | Status: ACTIVE | COMMUNITY
Start: 2022-09-23

## 2024-05-25 LAB
CULTURE RESULTS: SIGNIFICANT CHANGE UP
SPECIMEN SOURCE: SIGNIFICANT CHANGE UP

## 2024-05-28 LAB — BACTERIA SPT CULT: NORMAL

## 2024-05-30 ENCOUNTER — APPOINTMENT (OUTPATIENT)
Dept: COLORECTAL SURGERY | Facility: CLINIC | Age: 84
End: 2024-05-30
Payer: MEDICARE

## 2024-05-30 VITALS
HEIGHT: 71 IN | HEART RATE: 73 BPM | TEMPERATURE: 97.7 F | BODY MASS INDEX: 17.22 KG/M2 | DIASTOLIC BLOOD PRESSURE: 72 MMHG | SYSTOLIC BLOOD PRESSURE: 120 MMHG | WEIGHT: 123 LBS

## 2024-05-30 DIAGNOSIS — K64.8 OTHER HEMORRHOIDS: ICD-10-CM

## 2024-05-30 PROCEDURE — 46221 LIGATION OF HEMORRHOID(S): CPT

## 2024-05-30 NOTE — PHYSICAL EXAM
[Abdomen Tenderness] : ~T No ~M abdominal tenderness [JVD] : no jugular venous distention  [Normal Breath Sounds] : Normal breath sounds [No Rash or Lesion] : No rash or lesion [Alert] : alert [Calm] : calm [de-identified] : Well appearing male in NAD [de-identified] : MMM [de-identified] : Limited range of motion with shuffling gait, and cane dependent. [FreeTextEntry1] : The pt was examined in the left lateral decubitus position with a medical assistant present for the entirety of the examination. Visual examination of the anal verge with effacement of the buttocks revealed a much smaller soft LL external hemorrhoid without thrombosis otherwise no masses, ulcerations, fissures or skin rashes. Digital rectal exam revealed no palpable mass or blood with sphincter tone within normal limits. A lubricated anoscope was then inserted revealing healthy appearing distal rectal mucosa and anoderm with grade II RA and RP columns, and grade I/II LL, noninflamed internal hemorrhoids.  I discussed with the patient the risks, benefits and alternatives to surgical management of symptomatic internal hemorrhoids and the patient wished to proceed with a trial of rubber band ligation. As such, with the anoscope in place the RA and RP hemorrhoidal columns were isolated and a rubber band successfully deployed proximal to the dentate line without difficulty to both columns individually. The patient tolerated the exam well.

## 2024-05-30 NOTE — HISTORY OF PRESENT ILLNESS
[FreeTextEntry1] : 84M with a hx of Parkinson's, referred by Dr. Dominguez. He was seen three weeks prior for symptomatic internal/external prolapsing hemorrhoids and underwent a trial of RBL of the LL column. Today he presents for interval follow up and reports much improved symptomatology but some lingering discomfort with BMs.  PMH: Parkinson's, Chronic constipation, Stroke, HTN, HLD, DM, Asthma, Arthritis Meds: ASA, Statin, Carbidopa-Levodopa, Gabapentin, Metformin, Flomax, Ventolin All: NKDA PSH: Hernia repair FH: Denies CRC/IBD Cscope: 7 years prior and reportedly WNL

## 2024-06-12 LAB
CULTURE RESULTS: SIGNIFICANT CHANGE UP
SPECIMEN SOURCE: SIGNIFICANT CHANGE UP

## 2024-06-17 ENCOUNTER — APPOINTMENT (OUTPATIENT)
Dept: HEART AND VASCULAR | Facility: CLINIC | Age: 84
End: 2024-06-17
Payer: MEDICARE

## 2024-06-17 VITALS
SYSTOLIC BLOOD PRESSURE: 94 MMHG | DIASTOLIC BLOOD PRESSURE: 60 MMHG | OXYGEN SATURATION: 98 % | TEMPERATURE: 94.4 F | WEIGHT: 124 LBS | HEART RATE: 67 BPM | HEIGHT: 71 IN | BODY MASS INDEX: 17.36 KG/M2

## 2024-06-17 DIAGNOSIS — Z01.818 ENCOUNTER FOR OTHER PREPROCEDURAL EXAMINATION: ICD-10-CM

## 2024-06-17 DIAGNOSIS — E78.00 PURE HYPERCHOLESTEROLEMIA, UNSPECIFIED: ICD-10-CM

## 2024-06-17 DIAGNOSIS — I73.9 PERIPHERAL VASCULAR DISEASE, UNSPECIFIED: ICD-10-CM

## 2024-06-17 DIAGNOSIS — I10 ESSENTIAL (PRIMARY) HYPERTENSION: ICD-10-CM

## 2024-06-17 DIAGNOSIS — I45.2 BIFASCICULAR BLOCK: ICD-10-CM

## 2024-06-17 PROCEDURE — 99214 OFFICE O/P EST MOD 30 MIN: CPT

## 2024-06-17 NOTE — REASON FOR VISIT
[FreeTextEntry1] : 83 y/o M with HTN, HLD(LDL 76), DM (A1c 6.5), mildly dilated Ao, Mild coronary calcification on chest CT, abnormal chest CT, Parkinson's Dz, Varicose veins, stroke  4/9/24 parkinsons dz has advanced, getting work up for cough with sputum production with bronchoscopy.  has had consistently low BPs. no dizziness. no CP. wife in office and dtr on the phone for apt.  6/17/24: Having numbness of his feet, pain in heel while sleeping - wakes him up. No toe pain. Improves with getting up.  Has mid back pain (not lower) and does not occur at the same time as his leg symptoms. Is able to walk 1/2 a block at most but mostly only able to walk around his house. Limited by fatigue. No LE pain or claudicating sx with exertion. No cP no exertion, no DERAS.

## 2024-06-17 NOTE — ASSESSMENT
[FreeTextEntry1] : LE pain  - heel pain in RLE at night while sleeping, improves with walking. Unable to palpate DP pulses bilaterally, with intact PT. Sluggish cap refill in RLE. Atypical location of night/rest pain and no claudication with exertion but will eval for PAD with YAMILET. Other ddx include neuropathy from DM2 or referred pain.   HTN- Well controlled. 4/9/24 d/t age and high fall risk, will reduce losartan to 25mg. Low BP today - his BP is labile with Parkinsons. Asymptomatic low BP.   HLD- LDL nearly at goal(76), consider increasing Rosuvastatin to 10 mg. 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 Will obtain echo to assess for calcification of the mitral and aortic valves and there respective annuli. Had echo which showed normal EF and no signficiant valve disease Apr 2024.

## 2024-06-17 NOTE — PHYSICAL EXAM
[Well Developed] : well developed [Well Nourished] : well nourished [No Acute Distress] : no acute distress [Normal Conjunctiva] : normal conjunctiva [Normal Venous Pressure] : normal venous pressure [No Carotid Bruit] : no carotid bruit [Normal S1, S2] : normal S1, S2 [No Murmur] : no murmur [No Rub] : no rub [No Gallop] : no gallop [Clear Lung Fields] : clear lung fields [Good Air Entry] : good air entry [No Respiratory Distress] : no respiratory distress  [Soft] : abdomen soft [Non Tender] : non-tender [No Masses/organomegaly] : no masses/organomegaly [Normal Bowel Sounds] : normal bowel sounds [Normal Gait] : normal gait [No Edema] : no edema [No Cyanosis] : no cyanosis [No Clubbing] : no clubbing [No Rash] : no rash [No Skin Lesions] : no skin lesions [Moves all extremities] : moves all extremities [Alert and Oriented] : alert and oriented [Normal PT B/L] : normal PT B/L [de-identified] : Unable to palpate DP pulses, no ulcers. RLE cap refill is sluggish. Excellent PT pulses bilaterally. Cool LE with hair loss of b/l LE

## 2024-06-19 ENCOUNTER — APPOINTMENT (OUTPATIENT)
Dept: PULMONOLOGY | Facility: CLINIC | Age: 84
End: 2024-06-19
Payer: MEDICARE

## 2024-06-19 VITALS
BODY MASS INDEX: 16.52 KG/M2 | OXYGEN SATURATION: 98 % | SYSTOLIC BLOOD PRESSURE: 110 MMHG | DIASTOLIC BLOOD PRESSURE: 74 MMHG | TEMPERATURE: 97.9 F | WEIGHT: 118 LBS | HEART RATE: 66 BPM | HEIGHT: 71 IN

## 2024-06-19 DIAGNOSIS — R64 CACHEXIA: ICD-10-CM

## 2024-06-19 PROCEDURE — 94664 DEMO&/EVAL PT USE INHALER: CPT

## 2024-06-19 PROCEDURE — 99214 OFFICE O/P EST MOD 30 MIN: CPT | Mod: 25

## 2024-06-19 RX ORDER — ALBUTEROL SULFATE 2.5 MG/3ML
(2.5 MG/3ML) SOLUTION RESPIRATORY (INHALATION)
Qty: 1 | Refills: 5 | Status: ACTIVE | COMMUNITY
Start: 2024-06-19 | End: 1900-01-01

## 2024-06-19 RX ORDER — SODIUM CHLORIDE SOLN NEBU 7% 7 %
7 NEBU SOLN INHALATION
Qty: 1 | Refills: 5 | Status: ACTIVE | COMMUNITY
Start: 2024-06-19 | End: 1900-01-01

## 2024-06-19 NOTE — PHYSICAL EXAM
[TextEntry] : Gen: Pleasant elderly man in no distress who looks stopped; speech is slow; hypomimia present HEENT: Sclera non-icteric, conjunctiva non-injected; oropharynx clear w/o thrush;  Neck: supple w/o cervical LAD; no bruits CV: Regular rate and rhythm, no murmurs, rubs or gallops Lungs: CTAB, no rales, wheezes, squeaks or rhonchi; able to cough and expectorate; breasth sounds diminished right base Extremities: no clubbing, cyanosis or edema Skin: no rash Psych: normal mood and affect Neuro:  non-focal

## 2024-06-19 NOTE — HISTORY OF PRESENT ILLNESS
[Former] : former [TextBox_4] : 85 yo M pmhx HTN, high cholesterol, arthritis, asthma (previously on Advair), allergies (on Flonase), DM type II, BPH, Parkinson's. Referred by Dr. Prasad to evaluation and management of chronic productive cough and suspected recurrent aspiration  Chronic cough with yellow phlegm. Chokes while drinking liquids.  Sp/Sw testing ordered: not performed Bronchoscopy 5/2/24: no endobronchial obstruction, rather large amount of secretion noted in lower bronchus intermedius and right lower lobe. Large amount of secretion was also noted in larynx during placement of LMA. Cx is growing haemophilus influenzae Airway clearance regimen: Aerobika with hypertonic saline 7% nebs Smartvest was prescribed (pending insurance clearance) Referred to [facility] center for airway clearance  5/22/24 Presents for evaluation c/o productive cough, day and night phlegm white, yellow, dark green Diet: soup with rice and beans, apple souce, smoothies, purees  6/19/24 FU Feeling better after a course of abx for h flu: less cough coughs in the morning when he wakes up and drinks cold fluid no cough with drinking juice phlegm is yellow when drinking cold fluid; white if warm (?) eating pureed food but very little and losing weight appt with SLP pending Today had edu session with RT re: airway clearance Vest to come next week [place] has not been recieved yet either

## 2024-06-19 NOTE — RESULTS/DATA
[TextEntry] : RADIOLOGY  23 CT CHEST Since 2/3/2022:  New right lower lobe opacity distal to mucous occluded bronchi, likely pneumonia or obstructive atelectasis. Recommend 8 week follow-up to assess for clearance.  New mild bronchiolar impaction in the left lower lobe.  Unchanged other small lung nodules and bilateral upper lobe linear scarring   Chest CT 22 Impression: 1. Evidence of remote pleural and parenchymal scarring consistent with the provided history of remote tuberculosis. No evidence of reactivation or active tuberculosis in this examination. 2. Bilateral subcentimeter and solid micronodules the largest measuring 5 mm within the apical posterior segment of the left upper lobe. In a low risk individual, no additional imaging surveillance is indicated. If this patient is high risk for the development of lung carcinoma, optional 12 month surveillance thoracic CT is suggested. 3. Clustered nonsolid nodules within the left lower lobe measuring up to 6 mm. As per Fleischner society 2017 guidelines, interval surveillance at 3-6 months to confirm persistence. 4. 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  Chest x-ray 21 Impression: biapical scarring   PFT   23 FVC: 2.76 L (73%)--> 2.91 L (77%) FEV1:1.84 L (64%)--> 1.99 L (69%) FEV1/FVC: 67%--> 68% HZK81-22%: 1.17 L/s (55%)--> 1.32 L/s (62%) T.66 L (82%) RV/T% DLCO: 21.46 (89%) 6MWT 23: 280 meters, SpO2 99% -> 95%  Spirometry 2023 FEV1 1.92 (70%) FEV1/FVC 70% FVC 2.75 (74%) ALA42-59% 1.31 (68%)  FeNO 2023 23 ppb  6MWT 2023 Walked 150 meters; Resting O2 99% RA, HR 77, BP 98/62, Yuniel dyspnea 0 End test O2 98% RA, HR 86, /7-, Yuniel dyspnea 0   EKG / ECHO      MICRO     PATH    OTHER LABS OF NOTE

## 2024-06-26 ENCOUNTER — NON-APPOINTMENT (OUTPATIENT)
Age: 84
End: 2024-06-26

## 2024-06-26 ENCOUNTER — APPOINTMENT (OUTPATIENT)
Dept: HEART AND VASCULAR | Facility: CLINIC | Age: 84
End: 2024-06-26
Payer: MEDICARE

## 2024-06-26 PROCEDURE — 93923 UPR/LXTR ART STDY 3+ LVLS: CPT

## 2024-06-27 ENCOUNTER — APPOINTMENT (OUTPATIENT)
Dept: COLORECTAL SURGERY | Facility: CLINIC | Age: 84
End: 2024-06-27
Payer: MEDICARE

## 2024-06-27 VITALS
WEIGHT: 118 LBS | HEIGHT: 71 IN | TEMPERATURE: 98.6 F | BODY MASS INDEX: 16.52 KG/M2 | DIASTOLIC BLOOD PRESSURE: 66 MMHG | HEART RATE: 81 BPM | SYSTOLIC BLOOD PRESSURE: 104 MMHG

## 2024-06-27 PROCEDURE — 46600 DIAGNOSTIC ANOSCOPY SPX: CPT

## 2024-06-30 PROBLEM — T17.928A ASPIRATION OF FOOD: Status: ACTIVE | Noted: 2024-05-22

## 2024-06-30 PROBLEM — J47.9 BRONCHIECTASIS: Status: ACTIVE | Noted: 2024-05-22

## 2024-06-30 PROBLEM — J45.909 ASTHMA, UNSPECIFIED ASTHMA SEVERITY, UNSPECIFIED WHETHER COMPLICATED, UNSPECIFIED WHETHER PERSISTENT: Status: ACTIVE | Noted: 2021-04-27

## 2024-07-02 ENCOUNTER — APPOINTMENT (OUTPATIENT)
Dept: PULMONOLOGY | Facility: CLINIC | Age: 84
End: 2024-07-02

## 2024-07-02 VITALS
WEIGHT: 120 LBS | HEART RATE: 83 BPM | RESPIRATION RATE: 12 BRPM | OXYGEN SATURATION: 97 % | DIASTOLIC BLOOD PRESSURE: 67 MMHG | SYSTOLIC BLOOD PRESSURE: 113 MMHG | BODY MASS INDEX: 16.8 KG/M2 | TEMPERATURE: 99 F | HEIGHT: 71 IN
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
## 2024-07-02 DIAGNOSIS — T17.928A FOOD IN RESPIRATORY TRACT, PART UNSPECIFIED CAUSING OTHER INJURY, INITIAL ENCOUNTER: ICD-10-CM

## 2024-07-02 DIAGNOSIS — J47.9 BRONCHIECTASIS, UNCOMPLICATED: ICD-10-CM

## 2024-07-02 DIAGNOSIS — J45.909 UNSPECIFIED ASTHMA, UNCOMPLICATED: ICD-10-CM

## 2024-07-02 PROCEDURE — ZZZZZ: CPT

## 2024-07-02 PROCEDURE — 94010 BREATHING CAPACITY TEST: CPT

## 2024-07-02 PROCEDURE — 99213 OFFICE O/P EST LOW 20 MIN: CPT | Mod: 25

## 2024-07-03 ENCOUNTER — OUTPATIENT (OUTPATIENT)
Dept: OUTPATIENT SERVICES | Facility: HOSPITAL | Age: 84
LOS: 1 days | End: 2024-07-03

## 2024-07-03 PROCEDURE — 74230 X-RAY XM SWLNG FUNCJ C+: CPT | Mod: 26

## 2024-07-03 PROCEDURE — 74230 X-RAY XM SWLNG FUNCJ C+: CPT

## 2024-07-08 ENCOUNTER — APPOINTMENT (OUTPATIENT)
Dept: FAMILY MEDICINE | Facility: CLINIC | Age: 84
End: 2024-07-08

## 2024-07-08 PROCEDURE — 99214 OFFICE O/P EST MOD 30 MIN: CPT

## 2024-07-08 PROCEDURE — G2211 COMPLEX E/M VISIT ADD ON: CPT

## 2024-07-09 ENCOUNTER — NON-APPOINTMENT (OUTPATIENT)
Age: 84
End: 2024-07-09

## 2024-07-26 NOTE — H&P ADULT - NSICDXPASTMEDICALHX_GEN_ALL_CORE_FT
Addended by: TERRIE POSEY on: 7/26/2024 02:35 PM     Modules accepted: Orders     PAST MEDICAL HISTORY:  Asthma     Diabetes     HTN (hypertension)     Parkinson's disease     Stroke

## 2024-08-12 ENCOUNTER — APPOINTMENT (OUTPATIENT)
Dept: RADIOLOGY | Facility: HOSPITAL | Age: 84
End: 2024-08-12

## 2024-10-02 ENCOUNTER — APPOINTMENT (OUTPATIENT)
Dept: PULMONOLOGY | Facility: CLINIC | Age: 84
End: 2024-10-02

## 2024-10-09 ENCOUNTER — APPOINTMENT (OUTPATIENT)
Dept: HEART AND VASCULAR | Facility: CLINIC | Age: 84
End: 2024-10-09